# Patient Record
Sex: FEMALE | Race: WHITE | NOT HISPANIC OR LATINO | Employment: UNEMPLOYED | URBAN - METROPOLITAN AREA
[De-identification: names, ages, dates, MRNs, and addresses within clinical notes are randomized per-mention and may not be internally consistent; named-entity substitution may affect disease eponyms.]

---

## 2017-01-04 ENCOUNTER — HOSPITAL ENCOUNTER (OUTPATIENT)
Dept: RADIOLOGY | Facility: MEDICAL CENTER | Age: 57
End: 2017-01-04
Attending: SURGERY
Payer: COMMERCIAL

## 2017-01-04 DIAGNOSIS — C50.419 MALIGNANT NEOPLASM OF UPPER-OUTER QUADRANT OF FEMALE BREAST, UNSPECIFIED LATERALITY: ICD-10-CM

## 2017-01-04 PROCEDURE — 38792 RA TRACER ID OF SENTINL NODE: CPT | Mod: RT

## 2017-01-05 PROBLEM — C50.419 MALIGNANT NEOPLASM OF UPPER-OUTER QUADRANT OF FEMALE BREAST (HCC): Status: RESOLVED | Noted: 2017-01-04 | Resolved: 2017-01-05

## 2017-02-06 ENCOUNTER — DOCUMENTATION (OUTPATIENT)
Dept: OTHER | Facility: MEDICAL CENTER | Age: 57
End: 2017-02-06

## 2017-04-12 ENCOUNTER — HOSPITAL ENCOUNTER (OUTPATIENT)
Dept: LAB | Facility: MEDICAL CENTER | Age: 57
End: 2017-04-12
Attending: SPECIALIST
Payer: COMMERCIAL

## 2017-04-12 ENCOUNTER — HOSPITAL ENCOUNTER (OUTPATIENT)
Dept: LAB | Facility: MEDICAL CENTER | Age: 57
End: 2017-04-12
Payer: COMMERCIAL

## 2017-04-12 LAB
ALBUMIN SERPL BCP-MCNC: 4 G/DL (ref 3.2–4.9)
ALBUMIN/GLOB SERPL: 1.4 G/DL
ALP SERPL-CCNC: 45 U/L (ref 30–99)
ALT SERPL-CCNC: 16 U/L (ref 2–50)
ANION GAP SERPL CALC-SCNC: 6 MMOL/L (ref 0–11.9)
AST SERPL-CCNC: 15 U/L (ref 12–45)
BASOPHILS # BLD AUTO: 0.6 % (ref 0–1.8)
BASOPHILS # BLD: 0.03 K/UL (ref 0–0.12)
BILIRUB SERPL-MCNC: 0.8 MG/DL (ref 0.1–1.5)
BUN SERPL-MCNC: 15 MG/DL (ref 8–22)
CALCIUM SERPL-MCNC: 9.3 MG/DL (ref 8.5–10.5)
CHLORIDE SERPL-SCNC: 105 MMOL/L (ref 96–112)
CO2 SERPL-SCNC: 27 MMOL/L (ref 20–33)
CREAT SERPL-MCNC: 0.91 MG/DL (ref 0.5–1.4)
EOSINOPHIL # BLD AUTO: 0.19 K/UL (ref 0–0.51)
EOSINOPHIL NFR BLD: 3.7 % (ref 0–6.9)
ERYTHROCYTE [DISTWIDTH] IN BLOOD BY AUTOMATED COUNT: 37 FL (ref 35.9–50)
GFR SERPL CREATININE-BSD FRML MDRD: >60 ML/MIN/1.73 M 2
GLOBULIN SER CALC-MCNC: 2.8 G/DL (ref 1.9–3.5)
GLUCOSE SERPL-MCNC: 99 MG/DL (ref 65–99)
HCT VFR BLD AUTO: 39.2 % (ref 37–47)
HGB BLD-MCNC: 13.7 G/DL (ref 12–16)
IMM GRANULOCYTES # BLD AUTO: 0.02 K/UL (ref 0–0.11)
IMM GRANULOCYTES NFR BLD AUTO: 0.4 % (ref 0–0.9)
LYMPHOCYTES # BLD AUTO: 0.73 K/UL (ref 1–4.8)
LYMPHOCYTES NFR BLD: 14 % (ref 22–41)
MCH RBC QN AUTO: 29.5 PG (ref 27–33)
MCHC RBC AUTO-ENTMCNC: 34.9 G/DL (ref 33.6–35)
MCV RBC AUTO: 84.3 FL (ref 81.4–97.8)
MONOCYTES # BLD AUTO: 0.32 K/UL (ref 0–0.85)
MONOCYTES NFR BLD AUTO: 6.2 % (ref 0–13.4)
NEUTROPHILS # BLD AUTO: 3.91 K/UL (ref 2–7.15)
NEUTROPHILS NFR BLD: 75.1 % (ref 44–72)
NRBC # BLD AUTO: 0 K/UL
NRBC BLD AUTO-RTO: 0 /100 WBC
PLATELET # BLD AUTO: 175 K/UL (ref 164–446)
PMV BLD AUTO: 9.9 FL (ref 9–12.9)
POTASSIUM SERPL-SCNC: 4.5 MMOL/L (ref 3.6–5.5)
PROT SERPL-MCNC: 6.8 G/DL (ref 6–8.2)
RBC # BLD AUTO: 4.65 M/UL (ref 4.2–5.4)
SODIUM SERPL-SCNC: 138 MMOL/L (ref 135–145)
WBC # BLD AUTO: 5.2 K/UL (ref 4.8–10.8)

## 2017-04-12 PROCEDURE — 86480 TB TEST CELL IMMUN MEASURE: CPT

## 2017-04-12 PROCEDURE — 85025 COMPLETE CBC W/AUTO DIFF WBC: CPT

## 2017-04-12 PROCEDURE — 86787 VARICELLA-ZOSTER ANTIBODY: CPT

## 2017-04-12 PROCEDURE — 36415 COLL VENOUS BLD VENIPUNCTURE: CPT

## 2017-04-12 PROCEDURE — 80053 COMPREHEN METABOLIC PANEL: CPT

## 2017-04-13 LAB
M TB TUBERC IFN-G BLD QL: NEGATIVE
M TB TUBERC IFN-G/MITOGEN IGNF BLD: -0
M TB TUBERC IGNF/MITOGEN IGNF CONTROL: 44.06 [IU]/ML
MITOGEN IGNF BCKGRD COR BLD-ACNC: 0.02 [IU]/ML

## 2017-04-14 LAB
VZV IGG SER IA-ACNC: 2875 IV
VZV IGM SER IA-ACNC: 0.04 ISR

## 2017-04-22 ENCOUNTER — HOSPITAL ENCOUNTER (OUTPATIENT)
Dept: RADIOLOGY | Facility: MEDICAL CENTER | Age: 57
End: 2017-04-22
Attending: SPECIALIST
Payer: COMMERCIAL

## 2017-04-22 DIAGNOSIS — G35 MULTIPLE SCLEROSIS (HCC): ICD-10-CM

## 2017-04-22 PROCEDURE — 70551 MRI BRAIN STEM W/O DYE: CPT

## 2017-06-22 ENCOUNTER — HOSPITAL ENCOUNTER (OUTPATIENT)
Dept: LAB | Facility: MEDICAL CENTER | Age: 57
End: 2017-06-22
Attending: SPECIALIST
Payer: COMMERCIAL

## 2017-06-22 LAB
ALBUMIN SERPL BCP-MCNC: 4.2 G/DL (ref 3.2–4.9)
ALBUMIN/GLOB SERPL: 1.6 G/DL
ALP SERPL-CCNC: 47 U/L (ref 30–99)
ALT SERPL-CCNC: 19 U/L (ref 2–50)
ANION GAP SERPL CALC-SCNC: 6 MMOL/L (ref 0–11.9)
AST SERPL-CCNC: 17 U/L (ref 12–45)
BASOPHILS # BLD AUTO: 0.6 % (ref 0–1.8)
BASOPHILS # BLD: 0.04 K/UL (ref 0–0.12)
BILIRUB SERPL-MCNC: 0.7 MG/DL (ref 0.1–1.5)
BUN SERPL-MCNC: 15 MG/DL (ref 8–22)
CALCIUM SERPL-MCNC: 9.4 MG/DL (ref 8.5–10.5)
CHLORIDE SERPL-SCNC: 106 MMOL/L (ref 96–112)
CO2 SERPL-SCNC: 25 MMOL/L (ref 20–33)
CREAT SERPL-MCNC: 0.9 MG/DL (ref 0.5–1.4)
EOSINOPHIL # BLD AUTO: 0.15 K/UL (ref 0–0.51)
EOSINOPHIL NFR BLD: 2.4 % (ref 0–6.9)
ERYTHROCYTE [DISTWIDTH] IN BLOOD BY AUTOMATED COUNT: 38.5 FL (ref 35.9–50)
GFR SERPL CREATININE-BSD FRML MDRD: >60 ML/MIN/1.73 M 2
GLOBULIN SER CALC-MCNC: 2.6 G/DL (ref 1.9–3.5)
GLUCOSE SERPL-MCNC: 111 MG/DL (ref 65–99)
HCT VFR BLD AUTO: 40.7 % (ref 37–47)
HGB BLD-MCNC: 14.1 G/DL (ref 12–16)
IMM GRANULOCYTES # BLD AUTO: 0.02 K/UL (ref 0–0.11)
IMM GRANULOCYTES NFR BLD AUTO: 0.3 % (ref 0–0.9)
LYMPHOCYTES # BLD AUTO: 0.71 K/UL (ref 1–4.8)
LYMPHOCYTES NFR BLD: 11.5 % (ref 22–41)
MCH RBC QN AUTO: 29.1 PG (ref 27–33)
MCHC RBC AUTO-ENTMCNC: 34.6 G/DL (ref 33.6–35)
MCV RBC AUTO: 84.1 FL (ref 81.4–97.8)
MONOCYTES # BLD AUTO: 0.47 K/UL (ref 0–0.85)
MONOCYTES NFR BLD AUTO: 7.6 % (ref 0–13.4)
NEUTROPHILS # BLD AUTO: 4.78 K/UL (ref 2–7.15)
NEUTROPHILS NFR BLD: 77.6 % (ref 44–72)
NRBC # BLD AUTO: 0 K/UL
NRBC BLD AUTO-RTO: 0 /100 WBC
PLATELET # BLD AUTO: 188 K/UL (ref 164–446)
PMV BLD AUTO: 9.8 FL (ref 9–12.9)
POTASSIUM SERPL-SCNC: 4.3 MMOL/L (ref 3.6–5.5)
PROT SERPL-MCNC: 6.8 G/DL (ref 6–8.2)
RBC # BLD AUTO: 4.84 M/UL (ref 4.2–5.4)
SODIUM SERPL-SCNC: 137 MMOL/L (ref 135–145)
WBC # BLD AUTO: 6.2 K/UL (ref 4.8–10.8)

## 2017-06-22 PROCEDURE — 80053 COMPREHEN METABOLIC PANEL: CPT

## 2017-06-22 PROCEDURE — 36415 COLL VENOUS BLD VENIPUNCTURE: CPT

## 2017-06-22 PROCEDURE — 85025 COMPLETE CBC W/AUTO DIFF WBC: CPT

## 2017-07-25 ENCOUNTER — HOSPITAL ENCOUNTER (OUTPATIENT)
Dept: LAB | Facility: MEDICAL CENTER | Age: 57
End: 2017-07-25
Attending: SPECIALIST
Payer: COMMERCIAL

## 2017-07-25 LAB
BASOPHILS # BLD AUTO: 0.9 % (ref 0–1.8)
BASOPHILS # BLD: 0.05 K/UL (ref 0–0.12)
EOSINOPHIL # BLD AUTO: 0.15 K/UL (ref 0–0.51)
EOSINOPHIL NFR BLD: 2.7 % (ref 0–6.9)
ERYTHROCYTE [DISTWIDTH] IN BLOOD BY AUTOMATED COUNT: 38.8 FL (ref 35.9–50)
HCT VFR BLD AUTO: 42 % (ref 37–47)
HGB BLD-MCNC: 14.4 G/DL (ref 12–16)
IMM GRANULOCYTES # BLD AUTO: 0.01 K/UL (ref 0–0.11)
IMM GRANULOCYTES NFR BLD AUTO: 0.2 % (ref 0–0.9)
LYMPHOCYTES # BLD AUTO: 0.82 K/UL (ref 1–4.8)
LYMPHOCYTES NFR BLD: 14.8 % (ref 22–41)
MCH RBC QN AUTO: 29 PG (ref 27–33)
MCHC RBC AUTO-ENTMCNC: 34.3 G/DL (ref 33.6–35)
MCV RBC AUTO: 84.5 FL (ref 81.4–97.8)
MONOCYTES # BLD AUTO: 0.41 K/UL (ref 0–0.85)
MONOCYTES NFR BLD AUTO: 7.4 % (ref 0–13.4)
NEUTROPHILS # BLD AUTO: 4.09 K/UL (ref 2–7.15)
NEUTROPHILS NFR BLD: 74 % (ref 44–72)
NRBC # BLD AUTO: 0 K/UL
NRBC BLD AUTO-RTO: 0 /100 WBC
PLATELET # BLD AUTO: 228 K/UL (ref 164–446)
PMV BLD AUTO: 9.8 FL (ref 9–12.9)
RBC # BLD AUTO: 4.97 M/UL (ref 4.2–5.4)
WBC # BLD AUTO: 5.5 K/UL (ref 4.8–10.8)

## 2017-07-25 PROCEDURE — 85025 COMPLETE CBC W/AUTO DIFF WBC: CPT

## 2017-07-25 PROCEDURE — 80053 COMPREHEN METABOLIC PANEL: CPT

## 2017-07-25 PROCEDURE — 36415 COLL VENOUS BLD VENIPUNCTURE: CPT

## 2017-07-26 LAB
ALBUMIN SERPL BCP-MCNC: 4.2 G/DL (ref 3.2–4.9)
ALBUMIN/GLOB SERPL: 1.5 G/DL
ALP SERPL-CCNC: 49 U/L (ref 30–99)
ALT SERPL-CCNC: 31 U/L (ref 2–50)
ANION GAP SERPL CALC-SCNC: 9 MMOL/L (ref 0–11.9)
AST SERPL-CCNC: 23 U/L (ref 12–45)
BILIRUB SERPL-MCNC: 0.8 MG/DL (ref 0.1–1.5)
BUN SERPL-MCNC: 13 MG/DL (ref 8–22)
CALCIUM SERPL-MCNC: 9.7 MG/DL (ref 8.5–10.5)
CHLORIDE SERPL-SCNC: 105 MMOL/L (ref 96–112)
CO2 SERPL-SCNC: 24 MMOL/L (ref 20–33)
CREAT SERPL-MCNC: 0.93 MG/DL (ref 0.5–1.4)
GFR SERPL CREATININE-BSD FRML MDRD: >60 ML/MIN/1.73 M 2
GLOBULIN SER CALC-MCNC: 2.8 G/DL (ref 1.9–3.5)
GLUCOSE SERPL-MCNC: 94 MG/DL (ref 65–99)
POTASSIUM SERPL-SCNC: 4.3 MMOL/L (ref 3.6–5.5)
PROT SERPL-MCNC: 7 G/DL (ref 6–8.2)
SODIUM SERPL-SCNC: 138 MMOL/L (ref 135–145)

## 2017-08-25 ENCOUNTER — HOSPITAL ENCOUNTER (OUTPATIENT)
Dept: LAB | Facility: MEDICAL CENTER | Age: 57
End: 2017-08-25
Attending: SPECIALIST
Payer: COMMERCIAL

## 2017-08-25 LAB
ALBUMIN SERPL BCP-MCNC: 4 G/DL (ref 3.2–4.9)
ALBUMIN/GLOB SERPL: 1.5 G/DL
ALP SERPL-CCNC: 43 U/L (ref 30–99)
ALT SERPL-CCNC: 27 U/L (ref 2–50)
ANION GAP SERPL CALC-SCNC: 8 MMOL/L (ref 0–11.9)
AST SERPL-CCNC: 20 U/L (ref 12–45)
BASOPHILS # BLD AUTO: 0.9 % (ref 0–1.8)
BASOPHILS # BLD: 0.05 K/UL (ref 0–0.12)
BILIRUB SERPL-MCNC: 0.5 MG/DL (ref 0.1–1.5)
BUN SERPL-MCNC: 13 MG/DL (ref 8–22)
CALCIUM SERPL-MCNC: 9.4 MG/DL (ref 8.5–10.5)
CHLORIDE SERPL-SCNC: 106 MMOL/L (ref 96–112)
CO2 SERPL-SCNC: 24 MMOL/L (ref 20–33)
CREAT SERPL-MCNC: 0.85 MG/DL (ref 0.5–1.4)
EOSINOPHIL # BLD AUTO: 0.18 K/UL (ref 0–0.51)
EOSINOPHIL NFR BLD: 3.4 % (ref 0–6.9)
ERYTHROCYTE [DISTWIDTH] IN BLOOD BY AUTOMATED COUNT: 38.8 FL (ref 35.9–50)
GFR SERPL CREATININE-BSD FRML MDRD: >60 ML/MIN/1.73 M 2
GLOBULIN SER CALC-MCNC: 2.7 G/DL (ref 1.9–3.5)
GLUCOSE SERPL-MCNC: 115 MG/DL (ref 65–99)
HCT VFR BLD AUTO: 40.2 % (ref 37–47)
HGB BLD-MCNC: 14 G/DL (ref 12–16)
IMM GRANULOCYTES # BLD AUTO: 0.01 K/UL (ref 0–0.11)
IMM GRANULOCYTES NFR BLD AUTO: 0.2 % (ref 0–0.9)
LYMPHOCYTES # BLD AUTO: 0.64 K/UL (ref 1–4.8)
LYMPHOCYTES NFR BLD: 12 % (ref 22–41)
MCH RBC QN AUTO: 29.7 PG (ref 27–33)
MCHC RBC AUTO-ENTMCNC: 34.8 G/DL (ref 33.6–35)
MCV RBC AUTO: 85.4 FL (ref 81.4–97.8)
MONOCYTES # BLD AUTO: 0.4 K/UL (ref 0–0.85)
MONOCYTES NFR BLD AUTO: 7.5 % (ref 0–13.4)
NEUTROPHILS # BLD AUTO: 4.05 K/UL (ref 2–7.15)
NEUTROPHILS NFR BLD: 76 % (ref 44–72)
NRBC # BLD AUTO: 0 K/UL
NRBC BLD AUTO-RTO: 0 /100 WBC
PLATELET # BLD AUTO: 198 K/UL (ref 164–446)
PMV BLD AUTO: 9.7 FL (ref 9–12.9)
POTASSIUM SERPL-SCNC: 4.2 MMOL/L (ref 3.6–5.5)
PROT SERPL-MCNC: 6.7 G/DL (ref 6–8.2)
RBC # BLD AUTO: 4.71 M/UL (ref 4.2–5.4)
SODIUM SERPL-SCNC: 138 MMOL/L (ref 135–145)
WBC # BLD AUTO: 5.3 K/UL (ref 4.8–10.8)

## 2017-08-25 PROCEDURE — 36415 COLL VENOUS BLD VENIPUNCTURE: CPT

## 2017-08-25 PROCEDURE — 80053 COMPREHEN METABOLIC PANEL: CPT

## 2017-08-25 PROCEDURE — 85025 COMPLETE CBC W/AUTO DIFF WBC: CPT

## 2017-09-26 ENCOUNTER — HOSPITAL ENCOUNTER (OUTPATIENT)
Dept: LAB | Facility: MEDICAL CENTER | Age: 57
End: 2017-09-26
Attending: SPECIALIST
Payer: COMMERCIAL

## 2017-09-26 LAB
ALBUMIN SERPL BCP-MCNC: 4 G/DL (ref 3.2–4.9)
ALBUMIN/GLOB SERPL: 1.5 G/DL
ALP SERPL-CCNC: 37 U/L (ref 30–99)
ALT SERPL-CCNC: 18 U/L (ref 2–50)
ANION GAP SERPL CALC-SCNC: 8 MMOL/L (ref 0–11.9)
AST SERPL-CCNC: 17 U/L (ref 12–45)
BASOPHILS # BLD AUTO: 0.7 % (ref 0–1.8)
BASOPHILS # BLD: 0.04 K/UL (ref 0–0.12)
BILIRUB SERPL-MCNC: 0.7 MG/DL (ref 0.1–1.5)
BUN SERPL-MCNC: 12 MG/DL (ref 8–22)
CALCIUM SERPL-MCNC: 9.2 MG/DL (ref 8.5–10.5)
CHLORIDE SERPL-SCNC: 105 MMOL/L (ref 96–112)
CO2 SERPL-SCNC: 24 MMOL/L (ref 20–33)
CREAT SERPL-MCNC: 0.85 MG/DL (ref 0.5–1.4)
EOSINOPHIL # BLD AUTO: 0.15 K/UL (ref 0–0.51)
EOSINOPHIL NFR BLD: 2.6 % (ref 0–6.9)
ERYTHROCYTE [DISTWIDTH] IN BLOOD BY AUTOMATED COUNT: 38.2 FL (ref 35.9–50)
GFR SERPL CREATININE-BSD FRML MDRD: >60 ML/MIN/1.73 M 2
GLOBULIN SER CALC-MCNC: 2.6 G/DL (ref 1.9–3.5)
GLUCOSE SERPL-MCNC: 93 MG/DL (ref 65–99)
HCT VFR BLD AUTO: 39.2 % (ref 37–47)
HGB BLD-MCNC: 13.3 G/DL (ref 12–16)
IMM GRANULOCYTES # BLD AUTO: 0.02 K/UL (ref 0–0.11)
IMM GRANULOCYTES NFR BLD AUTO: 0.3 % (ref 0–0.9)
LYMPHOCYTES # BLD AUTO: 0.91 K/UL (ref 1–4.8)
LYMPHOCYTES NFR BLD: 15.7 % (ref 22–41)
MCH RBC QN AUTO: 28.7 PG (ref 27–33)
MCHC RBC AUTO-ENTMCNC: 33.9 G/DL (ref 33.6–35)
MCV RBC AUTO: 84.7 FL (ref 81.4–97.8)
MONOCYTES # BLD AUTO: 0.42 K/UL (ref 0–0.85)
MONOCYTES NFR BLD AUTO: 7.2 % (ref 0–13.4)
NEUTROPHILS # BLD AUTO: 4.27 K/UL (ref 2–7.15)
NEUTROPHILS NFR BLD: 73.5 % (ref 44–72)
NRBC # BLD AUTO: 0 K/UL
NRBC BLD AUTO-RTO: 0 /100 WBC
PLATELET # BLD AUTO: 200 K/UL (ref 164–446)
PMV BLD AUTO: 9.7 FL (ref 9–12.9)
POTASSIUM SERPL-SCNC: 4.3 MMOL/L (ref 3.6–5.5)
PROT SERPL-MCNC: 6.6 G/DL (ref 6–8.2)
RBC # BLD AUTO: 4.63 M/UL (ref 4.2–5.4)
SODIUM SERPL-SCNC: 137 MMOL/L (ref 135–145)
WBC # BLD AUTO: 5.8 K/UL (ref 4.8–10.8)

## 2017-09-26 PROCEDURE — 85025 COMPLETE CBC W/AUTO DIFF WBC: CPT

## 2017-09-26 PROCEDURE — 80053 COMPREHEN METABOLIC PANEL: CPT

## 2017-09-26 PROCEDURE — 36415 COLL VENOUS BLD VENIPUNCTURE: CPT

## 2017-10-03 ENCOUNTER — HOSPITAL ENCOUNTER (OUTPATIENT)
Dept: LAB | Facility: MEDICAL CENTER | Age: 57
End: 2017-10-03
Attending: SPECIALIST
Payer: COMMERCIAL

## 2017-10-03 PROCEDURE — 81001 URINALYSIS AUTO W/SCOPE: CPT

## 2017-10-03 PROCEDURE — 87086 URINE CULTURE/COLONY COUNT: CPT

## 2017-10-04 LAB
APPEARANCE UR: CLEAR
APPEARANCE UR: CLEAR
BACTERIA #/AREA URNS HPF: NEGATIVE /HPF
BILIRUB UR QL STRIP.AUTO: ABNORMAL
BILIRUB UR QL STRIP.AUTO: ABNORMAL
COLOR UR: ABNORMAL
COLOR UR: ABNORMAL
CULTURE IF INDICATED INDCX: YES UA CULTURE
EPI CELLS #/AREA URNS HPF: ABNORMAL /HPF
GLUCOSE UR STRIP.AUTO-MCNC: NEGATIVE MG/DL
GLUCOSE UR STRIP.AUTO-MCNC: NEGATIVE MG/DL
HYALINE CASTS #/AREA URNS LPF: ABNORMAL /LPF
KETONES UR STRIP.AUTO-MCNC: ABNORMAL MG/DL
KETONES UR STRIP.AUTO-MCNC: ABNORMAL MG/DL
LEUKOCYTE ESTERASE UR QL STRIP.AUTO: NEGATIVE
LEUKOCYTE ESTERASE UR QL STRIP.AUTO: NEGATIVE
MICRO URNS: ABNORMAL
MICRO URNS: ABNORMAL
NITRITE UR QL STRIP.AUTO: NEGATIVE
NITRITE UR QL STRIP.AUTO: NEGATIVE
PH UR STRIP.AUTO: 6 [PH]
PH UR STRIP.AUTO: 6 [PH]
PROT UR QL STRIP: 30 MG/DL
PROT UR QL STRIP: 30 MG/DL
RBC # URNS HPF: ABNORMAL /HPF
RBC UR QL AUTO: NEGATIVE
RBC UR QL AUTO: NEGATIVE
SP GR UR STRIP.AUTO: 1.03
SP GR UR STRIP.AUTO: 1.03
UROBILINOGEN UR STRIP.AUTO-MCNC: 2 MG/DL
UROBILINOGEN UR STRIP.AUTO-MCNC: NORMAL MG/DL
WBC #/AREA URNS HPF: ABNORMAL /HPF

## 2017-10-06 LAB
BACTERIA UR CULT: NORMAL
SIGNIFICANT IND 70042: NORMAL
SOURCE SOURCE: NORMAL

## 2017-10-12 ENCOUNTER — OUTPATIENT INFUSION SERVICES (OUTPATIENT)
Dept: ONCOLOGY | Facility: MEDICAL CENTER | Age: 57
End: 2017-10-12
Attending: SPECIALIST
Payer: COMMERCIAL

## 2017-10-12 VITALS
TEMPERATURE: 97.5 F | WEIGHT: 185.41 LBS | HEART RATE: 79 BPM | SYSTOLIC BLOOD PRESSURE: 129 MMHG | DIASTOLIC BLOOD PRESSURE: 68 MMHG | OXYGEN SATURATION: 96 % | BODY MASS INDEX: 32.85 KG/M2 | HEIGHT: 63 IN | RESPIRATION RATE: 18 BRPM

## 2017-10-12 PROCEDURE — 700105 HCHG RX REV CODE 258: Performed by: SPECIALIST

## 2017-10-12 PROCEDURE — 700111 HCHG RX REV CODE 636 W/ 250 OVERRIDE (IP): Performed by: SPECIALIST

## 2017-10-12 PROCEDURE — 96365 THER/PROPH/DIAG IV INF INIT: CPT

## 2017-10-12 RX ADMIN — SODIUM CHLORIDE 1000 MG: 9 INJECTION, SOLUTION INTRAVENOUS at 16:26

## 2017-10-12 ASSESSMENT — PAIN SCALES - GENERAL: PAINLEVEL: 4=SLIGHT-MODERATE PAIN

## 2017-10-13 ENCOUNTER — OUTPATIENT INFUSION SERVICES (OUTPATIENT)
Dept: ONCOLOGY | Facility: MEDICAL CENTER | Age: 57
End: 2017-10-13
Attending: SPECIALIST
Payer: COMMERCIAL

## 2017-10-13 VITALS
HEIGHT: 63 IN | WEIGHT: 185.41 LBS | BODY MASS INDEX: 32.85 KG/M2 | OXYGEN SATURATION: 93 % | HEART RATE: 77 BPM | DIASTOLIC BLOOD PRESSURE: 57 MMHG | SYSTOLIC BLOOD PRESSURE: 111 MMHG | RESPIRATION RATE: 18 BRPM | TEMPERATURE: 99 F

## 2017-10-13 PROCEDURE — 96365 THER/PROPH/DIAG IV INF INIT: CPT

## 2017-10-13 PROCEDURE — 700105 HCHG RX REV CODE 258: Performed by: SPECIALIST

## 2017-10-13 PROCEDURE — 700111 HCHG RX REV CODE 636 W/ 250 OVERRIDE (IP): Performed by: SPECIALIST

## 2017-10-13 RX ADMIN — SODIUM CHLORIDE 1000 MG: 9 INJECTION, SOLUTION INTRAVENOUS at 13:55

## 2017-10-13 ASSESSMENT — PAIN SCALES - GENERAL: PAINLEVEL: NO PAIN

## 2017-10-13 NOTE — PROGRESS NOTES
Pt arrived for day #1/3 of Solumedrol for an MS exacerbation. Pt told RN that her MS flare developed through increasing weakness and pain in both of her legs. Stated that she tried Solu-Medrol for a flare in the past, but it didn't work. Pt was hopefull that infusion will work this time. Education done on Solumedrol and possible side effects. Pt given informational handout, and questions answered by RN as presented. Pt requested that PIV be removed after infusion, IV discontinued, bleeding controlled with gauze and coban after. Tomorrow's appointment at 13:30 confirmed with Pt prior to leaving, left department by self using personal cane appearing in good spirits and NAD.

## 2017-10-14 ENCOUNTER — OUTPATIENT INFUSION SERVICES (OUTPATIENT)
Dept: ONCOLOGY | Facility: MEDICAL CENTER | Age: 57
End: 2017-10-14
Attending: SPECIALIST
Payer: COMMERCIAL

## 2017-10-14 VITALS
HEIGHT: 63 IN | TEMPERATURE: 97.2 F | HEART RATE: 80 BPM | SYSTOLIC BLOOD PRESSURE: 110 MMHG | DIASTOLIC BLOOD PRESSURE: 70 MMHG | BODY MASS INDEX: 32.66 KG/M2 | RESPIRATION RATE: 18 BRPM | OXYGEN SATURATION: 95 % | WEIGHT: 184.3 LBS

## 2017-10-14 PROCEDURE — 700111 HCHG RX REV CODE 636 W/ 250 OVERRIDE (IP): Performed by: SPECIALIST

## 2017-10-14 PROCEDURE — 700105 HCHG RX REV CODE 258: Performed by: SPECIALIST

## 2017-10-14 PROCEDURE — 96365 THER/PROPH/DIAG IV INF INIT: CPT

## 2017-10-14 RX ADMIN — SODIUM CHLORIDE 1000 MG: 9 INJECTION, SOLUTION INTRAVENOUS at 13:36

## 2017-10-14 ASSESSMENT — PAIN SCALES - GENERAL: PAINLEVEL: 4=SLIGHT-MODERATE PAIN

## 2017-10-14 NOTE — PROGRESS NOTES
Lisa arrives for day #3/3 of solumedrol to treat an MS exacerbation PIV established. Solumedrol given over 1 hour. PIV removed; gauze/coban applied over site. Patient to follow up with MD for future plan of care. Discharged to self care with cane; no apparent distress noted.

## 2017-10-14 NOTE — PROGRESS NOTES
Mrs Chiang arrives for day #2 of solumedrol to treat an MS exacerbation. Education done on solumedrol and possible side effects.Lisa given informational handout on the drug. PIV removed per patients request, site wrapped in gauze and coban. Lisa DC'd home in good  condition with  with family. Lisa returns tomorrow for day #3. Patient fall today at the IS, refuses ER evaluation, no deficiencies or other injuries noted.

## 2017-10-31 ENCOUNTER — HOSPITAL ENCOUNTER (OUTPATIENT)
Dept: LAB | Facility: MEDICAL CENTER | Age: 57
End: 2017-10-31
Attending: SPECIALIST
Payer: COMMERCIAL

## 2017-10-31 LAB
BASOPHILS # BLD AUTO: 1 % (ref 0–1.8)
BASOPHILS # BLD: 0.04 K/UL (ref 0–0.12)
EOSINOPHIL # BLD AUTO: 0.12 K/UL (ref 0–0.51)
EOSINOPHIL NFR BLD: 2.9 % (ref 0–6.9)
ERYTHROCYTE [DISTWIDTH] IN BLOOD BY AUTOMATED COUNT: 40.7 FL (ref 35.9–50)
HCT VFR BLD AUTO: 38.8 % (ref 37–47)
HGB BLD-MCNC: 13.2 G/DL (ref 12–16)
IMM GRANULOCYTES # BLD AUTO: 0.02 K/UL (ref 0–0.11)
IMM GRANULOCYTES NFR BLD AUTO: 0.5 % (ref 0–0.9)
LYMPHOCYTES # BLD AUTO: 0.68 K/UL (ref 1–4.8)
LYMPHOCYTES NFR BLD: 16.2 % (ref 22–41)
MCH RBC QN AUTO: 29.5 PG (ref 27–33)
MCHC RBC AUTO-ENTMCNC: 34 G/DL (ref 33.6–35)
MCV RBC AUTO: 86.8 FL (ref 81.4–97.8)
MONOCYTES # BLD AUTO: 0.35 K/UL (ref 0–0.85)
MONOCYTES NFR BLD AUTO: 8.3 % (ref 0–13.4)
NEUTROPHILS # BLD AUTO: 3 K/UL (ref 2–7.15)
NEUTROPHILS NFR BLD: 71.1 % (ref 44–72)
NRBC # BLD AUTO: 0 K/UL
NRBC BLD AUTO-RTO: 0 /100 WBC
PLATELET # BLD AUTO: 163 K/UL (ref 164–446)
PMV BLD AUTO: 9.6 FL (ref 9–12.9)
RBC # BLD AUTO: 4.47 M/UL (ref 4.2–5.4)
WBC # BLD AUTO: 4.2 K/UL (ref 4.8–10.8)

## 2017-10-31 PROCEDURE — 85025 COMPLETE CBC W/AUTO DIFF WBC: CPT

## 2017-10-31 PROCEDURE — 36415 COLL VENOUS BLD VENIPUNCTURE: CPT

## 2017-10-31 PROCEDURE — 80053 COMPREHEN METABOLIC PANEL: CPT

## 2017-11-01 LAB
ALBUMIN SERPL BCP-MCNC: 4.1 G/DL (ref 3.2–4.9)
ALBUMIN/GLOB SERPL: 1.7 G/DL
ALP SERPL-CCNC: 39 U/L (ref 30–99)
ALT SERPL-CCNC: 19 U/L (ref 2–50)
ANION GAP SERPL CALC-SCNC: 7 MMOL/L (ref 0–11.9)
AST SERPL-CCNC: 16 U/L (ref 12–45)
BILIRUB SERPL-MCNC: 0.8 MG/DL (ref 0.1–1.5)
BUN SERPL-MCNC: 13 MG/DL (ref 8–22)
CALCIUM SERPL-MCNC: 8.9 MG/DL (ref 8.5–10.5)
CHLORIDE SERPL-SCNC: 106 MMOL/L (ref 96–112)
CO2 SERPL-SCNC: 25 MMOL/L (ref 20–33)
CREAT SERPL-MCNC: 0.82 MG/DL (ref 0.5–1.4)
GFR SERPL CREATININE-BSD FRML MDRD: >60 ML/MIN/1.73 M 2
GLOBULIN SER CALC-MCNC: 2.4 G/DL (ref 1.9–3.5)
GLUCOSE SERPL-MCNC: 91 MG/DL (ref 65–99)
POTASSIUM SERPL-SCNC: 4.1 MMOL/L (ref 3.6–5.5)
PROT SERPL-MCNC: 6.5 G/DL (ref 6–8.2)
SODIUM SERPL-SCNC: 138 MMOL/L (ref 135–145)

## 2017-11-16 ENCOUNTER — HOSPITAL ENCOUNTER (OUTPATIENT)
Dept: RADIOLOGY | Facility: MEDICAL CENTER | Age: 57
End: 2017-11-16
Attending: INTERNAL MEDICINE
Payer: COMMERCIAL

## 2017-11-16 DIAGNOSIS — C43.72 MALIGNANT MELANOMA OF LEFT LOWER EXTREMITY INCLUDING HIP (HCC): ICD-10-CM

## 2017-11-16 DIAGNOSIS — C50.211 MALIGNANT NEOPLASM OF UPPER-INNER QUADRANT OF RIGHT FEMALE BREAST, UNSPECIFIED ESTROGEN RECEPTOR STATUS (HCC): ICD-10-CM

## 2017-11-16 PROCEDURE — A9552 F18 FDG: HCPCS

## 2018-03-27 ENCOUNTER — HOSPITAL ENCOUNTER (OUTPATIENT)
Dept: LAB | Facility: MEDICAL CENTER | Age: 58
End: 2018-03-27
Attending: SPECIALIST
Payer: COMMERCIAL

## 2018-03-27 LAB
ALBUMIN SERPL BCP-MCNC: 4.1 G/DL (ref 3.2–4.9)
ALBUMIN/GLOB SERPL: 1.7 G/DL
ALP SERPL-CCNC: 39 U/L (ref 30–99)
ALT SERPL-CCNC: 14 U/L (ref 2–50)
ANION GAP SERPL CALC-SCNC: 7 MMOL/L (ref 0–11.9)
AST SERPL-CCNC: 14 U/L (ref 12–45)
BASOPHILS # BLD AUTO: 0.9 % (ref 0–1.8)
BASOPHILS # BLD: 0.05 K/UL (ref 0–0.12)
BILIRUB SERPL-MCNC: 0.8 MG/DL (ref 0.1–1.5)
BUN SERPL-MCNC: 14 MG/DL (ref 8–22)
CALCIUM SERPL-MCNC: 9.5 MG/DL (ref 8.5–10.5)
CHLORIDE SERPL-SCNC: 106 MMOL/L (ref 96–112)
CO2 SERPL-SCNC: 24 MMOL/L (ref 20–33)
CREAT SERPL-MCNC: 0.8 MG/DL (ref 0.5–1.4)
EOSINOPHIL # BLD AUTO: 0.24 K/UL (ref 0–0.51)
EOSINOPHIL NFR BLD: 4.3 % (ref 0–6.9)
ERYTHROCYTE [DISTWIDTH] IN BLOOD BY AUTOMATED COUNT: 39.8 FL (ref 35.9–50)
GLOBULIN SER CALC-MCNC: 2.4 G/DL (ref 1.9–3.5)
GLUCOSE SERPL-MCNC: 94 MG/DL (ref 65–99)
HCT VFR BLD AUTO: 39.8 % (ref 37–47)
HGB BLD-MCNC: 14 G/DL (ref 12–16)
IMM GRANULOCYTES # BLD AUTO: 0.01 K/UL (ref 0–0.11)
IMM GRANULOCYTES NFR BLD AUTO: 0.2 % (ref 0–0.9)
LYMPHOCYTES # BLD AUTO: 0.91 K/UL (ref 1–4.8)
LYMPHOCYTES NFR BLD: 16.3 % (ref 22–41)
MCH RBC QN AUTO: 29.4 PG (ref 27–33)
MCHC RBC AUTO-ENTMCNC: 35.2 G/DL (ref 33.6–35)
MCV RBC AUTO: 83.6 FL (ref 81.4–97.8)
MONOCYTES # BLD AUTO: 0.39 K/UL (ref 0–0.85)
MONOCYTES NFR BLD AUTO: 7 % (ref 0–13.4)
NEUTROPHILS # BLD AUTO: 3.98 K/UL (ref 2–7.15)
NEUTROPHILS NFR BLD: 71.3 % (ref 44–72)
NRBC # BLD AUTO: 0 K/UL
NRBC BLD-RTO: 0 /100 WBC
PLATELET # BLD AUTO: 188 K/UL (ref 164–446)
PMV BLD AUTO: 9.8 FL (ref 9–12.9)
POTASSIUM SERPL-SCNC: 4.3 MMOL/L (ref 3.6–5.5)
PROT SERPL-MCNC: 6.5 G/DL (ref 6–8.2)
RBC # BLD AUTO: 4.76 M/UL (ref 4.2–5.4)
SODIUM SERPL-SCNC: 137 MMOL/L (ref 135–145)
WBC # BLD AUTO: 5.6 K/UL (ref 4.8–10.8)

## 2018-03-27 PROCEDURE — 36415 COLL VENOUS BLD VENIPUNCTURE: CPT

## 2018-03-27 PROCEDURE — 80053 COMPREHEN METABOLIC PANEL: CPT

## 2018-03-27 PROCEDURE — 85025 COMPLETE CBC W/AUTO DIFF WBC: CPT

## 2018-06-15 ENCOUNTER — OFFICE VISIT (OUTPATIENT)
Dept: URGENT CARE | Facility: CLINIC | Age: 58
End: 2018-06-15
Payer: COMMERCIAL

## 2018-06-15 VITALS
SYSTOLIC BLOOD PRESSURE: 120 MMHG | HEART RATE: 96 BPM | HEIGHT: 63 IN | BODY MASS INDEX: 30.12 KG/M2 | OXYGEN SATURATION: 98 % | TEMPERATURE: 96.8 F | DIASTOLIC BLOOD PRESSURE: 70 MMHG | WEIGHT: 170 LBS

## 2018-06-15 DIAGNOSIS — S01.00XA OPEN WOUND OF SCALP, UNSPECIFIED OPEN WOUND TYPE, INITIAL ENCOUNTER: ICD-10-CM

## 2018-06-15 PROCEDURE — 12002 RPR S/N/AX/GEN/TRNK2.6-7.5CM: CPT | Performed by: NURSE PRACTITIONER

## 2018-06-15 ASSESSMENT — ENCOUNTER SYMPTOMS
NECK PAIN: 0
BLURRED VISION: 0
HEADACHES: 0
NAUSEA: 0
DOUBLE VISION: 0
LOSS OF CONSCIOUSNESS: 0
BACK PAIN: 0

## 2018-06-16 NOTE — PROGRESS NOTES
"Subjective:      Lisa Chiang is a 57 y.o. female who presents with Laceration (\"pt fell backwards and hit head on the wall\")            HPI New problem. 57 year old female with laceration to back of head after a fall into a wall. She has MS so her mobility is impaired. She denies LOC, headache, nausea, dizziness or visual changes. She denies neck pain or back pain. She has come straight here for evaluation.  Patient has no known allergies.  Current Outpatient Prescriptions on File Prior to Visit   Medication Sig Dispense Refill   • Teriflunomide (AUBAGIO PO) Take  by mouth.     • levothyroxine (SYNTHROID) 150 MCG Tab Take 150 mcg by mouth Every morning on an empty stomach.     • acetaminophen (TYLENOL) 325 MG Tab Take 650 mg by mouth every four hours as needed for Mild Pain.     • paroxetine (PAXIL) 30 MG TABS Take 45 mg by mouth every morning. Take with food     • oxycodone immediate-release (ROXICODONE) 5 MG Tab Take 1-2 Tabs by mouth every four hours as needed (Moderate Pain (NRS Pain Scale 4-6; CPOT Pain Scale 3-5)). 30 Tab 0   • aspirin (ASA) 81 MG Chew Tab chewable tablet Take 2 Tabs by mouth every day. 100 Tab      No current facility-administered medications on file prior to visit.      Social History     Social History   • Marital status:      Spouse name: N/A   • Number of children: N/A   • Years of education: N/A     Occupational History   • Not on file.     Social History Main Topics   • Smoking status: Former Smoker     Packs/day: 1.00     Years: 20.00     Types: Cigarettes     Quit date: 7/5/1997   • Smokeless tobacco: Never Used   • Alcohol use No   • Drug use: No   • Sexual activity: Not on file     Other Topics Concern   • Not on file     Social History Narrative   • No narrative on file     family history includes Cancer in her maternal aunt.      Review of Systems   Eyes: Negative for blurred vision and double vision.   Gastrointestinal: Negative for nausea.   Musculoskeletal: " "Negative for back pain and neck pain.   Skin:        Laceration to back of head.   Neurological: Negative for loss of consciousness and headaches.          Objective:     /70   Pulse 96   Temp 36 °C (96.8 °F)   Ht 1.6 m (5' 3\")   Wt 77.1 kg (170 lb)   SpO2 98%   BMI 30.11 kg/m²      Physical Exam   Constitutional: She is oriented to person, place, and time. She appears well-developed and well-nourished. No distress.   HENT:   Head: Normocephalic and atraumatic.   Right Ear: External ear and ear canal normal. Tympanic membrane is not injected and not perforated. No middle ear effusion.   Left Ear: External ear and ear canal normal. Tympanic membrane is not injected and not perforated.  No middle ear effusion.   Nose: Mucosal edema present.   Mouth/Throat: Posterior oropharyngeal erythema present. No oropharyngeal exudate.   Eyes: Conjunctivae are normal. Right eye exhibits no discharge. Left eye exhibits no discharge.   Neck: Normal range of motion. Neck supple.   Cardiovascular: Normal rate, regular rhythm and normal heart sounds.    No murmur heard.  Pulmonary/Chest: Effort normal and breath sounds normal. No respiratory distress.   Musculoskeletal: Normal range of motion.   Normal movement of all 4 extremities.   Lymphadenopathy:     She has no cervical adenopathy.        Right: No supraclavicular adenopathy present.        Left: No supraclavicular adenopathy present.   Neurological: She is alert and oriented to person, place, and time. No cranial nerve deficit. Gait normal.   Skin: Skin is warm and dry.   approx 6 inch laceration to posterior scalp area.   Psychiatric: She has a normal mood and affect. Her behavior is normal. Thought content normal.   Nursing note and vitals reviewed.    Procedure: Laceration Repair  -Risks including bleeding, nerve damage, infection, and poor cosmetic outcome discussed at length. Benefits and alternatives discussed.   -Sterile technique throughout  -Local anesthesia " with 2% lidocaine with epi.-Closed with 6 staples with good wound approximation  -Patient tolerated well                Assessment/Plan:     1. Open wound of scalp, unspecified open wound type, initial encounter  Wound closure as above.  Wound care instructions to patient and spouse.  RTC 7 days for removal.

## 2018-06-25 ENCOUNTER — HOSPITAL ENCOUNTER (OUTPATIENT)
Dept: RADIOLOGY | Facility: MEDICAL CENTER | Age: 58
End: 2018-06-25
Attending: FAMILY MEDICINE
Payer: COMMERCIAL

## 2018-06-25 ENCOUNTER — OFFICE VISIT (OUTPATIENT)
Dept: URGENT CARE | Facility: CLINIC | Age: 58
End: 2018-06-25
Payer: COMMERCIAL

## 2018-06-25 VITALS
TEMPERATURE: 98.7 F | WEIGHT: 170 LBS | OXYGEN SATURATION: 97 % | SYSTOLIC BLOOD PRESSURE: 110 MMHG | HEART RATE: 64 BPM | DIASTOLIC BLOOD PRESSURE: 60 MMHG | BODY MASS INDEX: 30.12 KG/M2 | HEIGHT: 63 IN

## 2018-06-25 DIAGNOSIS — M79.672 LEFT FOOT PAIN: ICD-10-CM

## 2018-06-25 DIAGNOSIS — S01.01XD LACERATION OF SCALP, SUBSEQUENT ENCOUNTER: ICD-10-CM

## 2018-06-25 PROCEDURE — 73620 X-RAY EXAM OF FOOT: CPT | Mod: LT

## 2018-06-25 PROCEDURE — 99024 POSTOP FOLLOW-UP VISIT: CPT | Performed by: FAMILY MEDICINE

## 2018-06-25 NOTE — PROGRESS NOTES
Here for wound check, s/p scalp lac repair      O:    Lac in back of scalp:  C/D/I with staples intact         P:  Staples removed  Wound intact

## 2018-08-07 ENCOUNTER — HOSPITAL ENCOUNTER (OUTPATIENT)
Dept: LAB | Facility: MEDICAL CENTER | Age: 58
End: 2018-08-07
Attending: SPECIALIST
Payer: COMMERCIAL

## 2018-08-07 LAB
ALBUMIN SERPL BCP-MCNC: 4.6 G/DL (ref 3.2–4.9)
ALBUMIN/GLOB SERPL: 2.4 G/DL
ALP SERPL-CCNC: 44 U/L (ref 30–99)
ALT SERPL-CCNC: 12 U/L (ref 2–50)
ANION GAP SERPL CALC-SCNC: 7 MMOL/L (ref 0–11.9)
AST SERPL-CCNC: 12 U/L (ref 12–45)
BASOPHILS # BLD AUTO: 0.6 % (ref 0–1.8)
BASOPHILS # BLD: 0.03 K/UL (ref 0–0.12)
BILIRUB SERPL-MCNC: 0.6 MG/DL (ref 0.1–1.5)
BUN SERPL-MCNC: 14 MG/DL (ref 8–22)
CALCIUM SERPL-MCNC: 9.2 MG/DL (ref 8.5–10.5)
CHLORIDE SERPL-SCNC: 106 MMOL/L (ref 96–112)
CO2 SERPL-SCNC: 25 MMOL/L (ref 20–33)
CREAT SERPL-MCNC: 0.81 MG/DL (ref 0.5–1.4)
EOSINOPHIL # BLD AUTO: 0.17 K/UL (ref 0–0.51)
EOSINOPHIL NFR BLD: 3.1 % (ref 0–6.9)
ERYTHROCYTE [DISTWIDTH] IN BLOOD BY AUTOMATED COUNT: 37.6 FL (ref 35.9–50)
GLOBULIN SER CALC-MCNC: 1.9 G/DL (ref 1.9–3.5)
GLUCOSE SERPL-MCNC: 106 MG/DL (ref 65–99)
HCT VFR BLD AUTO: 39.9 % (ref 37–47)
HGB BLD-MCNC: 13.9 G/DL (ref 12–16)
IMM GRANULOCYTES # BLD AUTO: 0.02 K/UL (ref 0–0.11)
IMM GRANULOCYTES NFR BLD AUTO: 0.4 % (ref 0–0.9)
LYMPHOCYTES # BLD AUTO: 0.92 K/UL (ref 1–4.8)
LYMPHOCYTES NFR BLD: 17 % (ref 22–41)
MCH RBC QN AUTO: 29.1 PG (ref 27–33)
MCHC RBC AUTO-ENTMCNC: 34.8 G/DL (ref 33.6–35)
MCV RBC AUTO: 83.6 FL (ref 81.4–97.8)
MONOCYTES # BLD AUTO: 0.39 K/UL (ref 0–0.85)
MONOCYTES NFR BLD AUTO: 7.2 % (ref 0–13.4)
NEUTROPHILS # BLD AUTO: 3.87 K/UL (ref 2–7.15)
NEUTROPHILS NFR BLD: 71.7 % (ref 44–72)
NRBC # BLD AUTO: 0 K/UL
NRBC BLD-RTO: 0 /100 WBC
PLATELET # BLD AUTO: 200 K/UL (ref 164–446)
PMV BLD AUTO: 9.7 FL (ref 9–12.9)
POTASSIUM SERPL-SCNC: 4 MMOL/L (ref 3.6–5.5)
PROT SERPL-MCNC: 6.5 G/DL (ref 6–8.2)
RBC # BLD AUTO: 4.77 M/UL (ref 4.2–5.4)
SODIUM SERPL-SCNC: 138 MMOL/L (ref 135–145)
WBC # BLD AUTO: 5.4 K/UL (ref 4.8–10.8)

## 2018-08-07 PROCEDURE — 85025 COMPLETE CBC W/AUTO DIFF WBC: CPT

## 2018-08-07 PROCEDURE — 36415 COLL VENOUS BLD VENIPUNCTURE: CPT

## 2018-08-07 PROCEDURE — 80053 COMPREHEN METABOLIC PANEL: CPT

## 2018-08-15 ENCOUNTER — HOSPITAL ENCOUNTER (OUTPATIENT)
Dept: LAB | Facility: MEDICAL CENTER | Age: 58
End: 2018-08-15
Attending: SPECIALIST
Payer: COMMERCIAL

## 2018-08-15 LAB
APPEARANCE UR: ABNORMAL
APPEARANCE UR: CLEAR
BACTERIA #/AREA URNS HPF: NEGATIVE /HPF
BILIRUB UR QL STRIP.AUTO: NEGATIVE
BILIRUB UR QL STRIP.AUTO: NEGATIVE
CAOX CRY #/AREA URNS HPF: ABNORMAL /HPF
COLOR UR: ABNORMAL
COLOR UR: NORMAL
EPI CELLS #/AREA URNS HPF: ABNORMAL /HPF
GLUCOSE UR STRIP.AUTO-MCNC: NEGATIVE MG/DL
GLUCOSE UR STRIP.AUTO-MCNC: NEGATIVE MG/DL
HYALINE CASTS #/AREA URNS LPF: ABNORMAL /LPF
KETONES UR STRIP.AUTO-MCNC: NEGATIVE MG/DL
KETONES UR STRIP.AUTO-MCNC: NEGATIVE MG/DL
LEUKOCYTE ESTERASE UR QL STRIP.AUTO: NEGATIVE
LEUKOCYTE ESTERASE UR QL STRIP.AUTO: NEGATIVE
MICRO URNS: ABNORMAL
MICRO URNS: NORMAL
NITRITE UR QL STRIP.AUTO: NEGATIVE
NITRITE UR QL STRIP.AUTO: NEGATIVE
PH UR STRIP.AUTO: 5 [PH]
PH UR STRIP.AUTO: 5 [PH]
PROT UR QL STRIP: NEGATIVE MG/DL
PROT UR QL STRIP: NEGATIVE MG/DL
RBC # URNS HPF: ABNORMAL /HPF
RBC UR QL AUTO: NEGATIVE
RBC UR QL AUTO: NEGATIVE
SP GR UR STRIP.AUTO: 1.03
SP GR UR STRIP.AUTO: 1.03
UROBILINOGEN UR STRIP.AUTO-MCNC: 0.2 MG/DL
UROBILINOGEN UR STRIP.AUTO-MCNC: 0.2 MG/DL
WBC #/AREA URNS HPF: ABNORMAL /HPF

## 2018-08-15 PROCEDURE — 36415 COLL VENOUS BLD VENIPUNCTURE: CPT

## 2018-08-15 PROCEDURE — 81001 URINALYSIS AUTO W/SCOPE: CPT

## 2018-08-15 PROCEDURE — 86480 TB TEST CELL IMMUN MEASURE: CPT

## 2018-08-17 LAB
M TB TUBERC IFN-G BLD QL: NEGATIVE
M TB TUBERC IFN-G/MITOGEN IGNF BLD: 0
M TB TUBERC IGNF/MITOGEN IGNF CONTROL: 36.62 [IU]/ML
MITOGEN IGNF BCKGRD COR BLD-ACNC: 0.01 [IU]/ML

## 2018-08-28 ENCOUNTER — HOSPITAL ENCOUNTER (OUTPATIENT)
Dept: RADIOLOGY | Facility: MEDICAL CENTER | Age: 58
End: 2018-08-28
Attending: SPECIALIST
Payer: COMMERCIAL

## 2018-08-28 DIAGNOSIS — G35 MULTIPLE SCLEROSIS (HCC): ICD-10-CM

## 2018-08-28 PROCEDURE — 70553 MRI BRAIN STEM W/O & W/DYE: CPT

## 2018-08-28 PROCEDURE — 72141 MRI NECK SPINE W/O DYE: CPT

## 2018-08-28 PROCEDURE — A9585 GADOBUTROL INJECTION: HCPCS | Performed by: SPECIALIST

## 2018-08-28 PROCEDURE — 700117 HCHG RX CONTRAST REV CODE 255: Performed by: SPECIALIST

## 2018-08-28 RX ORDER — GADOBUTROL 604.72 MG/ML
7.5 INJECTION INTRAVENOUS ONCE
Status: COMPLETED | OUTPATIENT
Start: 2018-08-28 | End: 2018-08-28

## 2018-08-28 RX ADMIN — GADOBUTROL 7.5 ML: 604.72 INJECTION INTRAVENOUS at 08:36

## 2018-10-16 ENCOUNTER — HOSPITAL ENCOUNTER (OUTPATIENT)
Dept: RADIOLOGY | Facility: MEDICAL CENTER | Age: 58
End: 2018-10-16
Attending: SPECIALIST
Payer: COMMERCIAL

## 2018-10-16 DIAGNOSIS — G35 MULTIPLE SCLEROSIS (HCC): ICD-10-CM

## 2018-10-16 PROCEDURE — 700117 HCHG RX CONTRAST REV CODE 255: Performed by: SPECIALIST

## 2018-10-16 PROCEDURE — A9585 GADOBUTROL INJECTION: HCPCS | Performed by: SPECIALIST

## 2018-10-16 PROCEDURE — 72158 MRI LUMBAR SPINE W/O & W/DYE: CPT

## 2018-10-16 PROCEDURE — 72157 MRI CHEST SPINE W/O & W/DYE: CPT

## 2018-10-16 RX ORDER — GADOBUTROL 604.72 MG/ML
7.5 INJECTION INTRAVENOUS ONCE
Status: COMPLETED | OUTPATIENT
Start: 2018-10-16 | End: 2018-10-16

## 2018-10-16 RX ADMIN — GADOBUTROL 7.5 ML: 604.72 INJECTION INTRAVENOUS at 08:03

## 2018-10-19 ENCOUNTER — HOSPITAL ENCOUNTER (OUTPATIENT)
Dept: LAB | Facility: MEDICAL CENTER | Age: 58
End: 2018-10-19
Attending: SPECIALIST
Payer: COMMERCIAL

## 2018-10-19 PROCEDURE — 36415 COLL VENOUS BLD VENIPUNCTURE: CPT

## 2018-10-19 PROCEDURE — 80299 QUANTITATIVE ASSAY DRUG: CPT

## 2018-10-22 LAB — TERIFLUNOMIDE SERPL-MCNC: <0.005 UG/ML

## 2019-01-08 ENCOUNTER — HOSPITAL ENCOUNTER (OUTPATIENT)
Dept: LAB | Facility: MEDICAL CENTER | Age: 59
End: 2019-01-08
Attending: SPECIALIST
Payer: COMMERCIAL

## 2019-01-08 LAB
ALBUMIN SERPL BCP-MCNC: 4.1 G/DL (ref 3.2–4.9)
ALBUMIN/GLOB SERPL: 1.5 G/DL
ALP SERPL-CCNC: 52 U/L (ref 30–99)
ALT SERPL-CCNC: 30 U/L (ref 2–50)
ANION GAP SERPL CALC-SCNC: 9 MMOL/L (ref 0–11.9)
AST SERPL-CCNC: 17 U/L (ref 12–45)
BASOPHILS # BLD AUTO: 0.6 % (ref 0–1.8)
BASOPHILS # BLD: 0.04 K/UL (ref 0–0.12)
BILIRUB SERPL-MCNC: 0.7 MG/DL (ref 0.1–1.5)
BUN SERPL-MCNC: 12 MG/DL (ref 8–22)
CALCIUM SERPL-MCNC: 9.6 MG/DL (ref 8.5–10.5)
CHLORIDE SERPL-SCNC: 108 MMOL/L (ref 96–112)
CO2 SERPL-SCNC: 22 MMOL/L (ref 20–33)
CREAT SERPL-MCNC: 0.99 MG/DL (ref 0.5–1.4)
EOSINOPHIL # BLD AUTO: 0.13 K/UL (ref 0–0.51)
EOSINOPHIL NFR BLD: 1.9 % (ref 0–6.9)
ERYTHROCYTE [DISTWIDTH] IN BLOOD BY AUTOMATED COUNT: 37.6 FL (ref 35.9–50)
GLOBULIN SER CALC-MCNC: 2.7 G/DL (ref 1.9–3.5)
GLUCOSE SERPL-MCNC: 99 MG/DL (ref 65–99)
HCT VFR BLD AUTO: 41.4 % (ref 37–47)
HGB BLD-MCNC: 14.6 G/DL (ref 12–16)
IMM GRANULOCYTES # BLD AUTO: 0.02 K/UL (ref 0–0.11)
IMM GRANULOCYTES NFR BLD AUTO: 0.3 % (ref 0–0.9)
LYMPHOCYTES # BLD AUTO: 1.29 K/UL (ref 1–4.8)
LYMPHOCYTES NFR BLD: 18.9 % (ref 22–41)
MCH RBC QN AUTO: 29.6 PG (ref 27–33)
MCHC RBC AUTO-ENTMCNC: 35.3 G/DL (ref 33.6–35)
MCV RBC AUTO: 84 FL (ref 81.4–97.8)
MONOCYTES # BLD AUTO: 0.41 K/UL (ref 0–0.85)
MONOCYTES NFR BLD AUTO: 6 % (ref 0–13.4)
NEUTROPHILS # BLD AUTO: 4.93 K/UL (ref 2–7.15)
NEUTROPHILS NFR BLD: 72.3 % (ref 44–72)
NRBC # BLD AUTO: 0 K/UL
NRBC BLD-RTO: 0 /100 WBC
PLATELET # BLD AUTO: 248 K/UL (ref 164–446)
PMV BLD AUTO: 9.8 FL (ref 9–12.9)
POTASSIUM SERPL-SCNC: 4 MMOL/L (ref 3.6–5.5)
PROT SERPL-MCNC: 6.8 G/DL (ref 6–8.2)
RBC # BLD AUTO: 4.93 M/UL (ref 4.2–5.4)
SODIUM SERPL-SCNC: 139 MMOL/L (ref 135–145)
WBC # BLD AUTO: 6.8 K/UL (ref 4.8–10.8)

## 2019-01-08 PROCEDURE — 36415 COLL VENOUS BLD VENIPUNCTURE: CPT

## 2019-01-08 PROCEDURE — 80053 COMPREHEN METABOLIC PANEL: CPT

## 2019-01-08 PROCEDURE — 85025 COMPLETE CBC W/AUTO DIFF WBC: CPT

## 2019-02-26 ENCOUNTER — OUTPATIENT INFUSION SERVICES (OUTPATIENT)
Dept: ONCOLOGY | Facility: MEDICAL CENTER | Age: 59
End: 2019-02-26
Attending: SPECIALIST
Payer: COMMERCIAL

## 2019-02-26 VITALS
RESPIRATION RATE: 18 BRPM | TEMPERATURE: 97.2 F | WEIGHT: 158.07 LBS | HEIGHT: 62 IN | SYSTOLIC BLOOD PRESSURE: 123 MMHG | BODY MASS INDEX: 29.09 KG/M2 | OXYGEN SATURATION: 94 % | HEART RATE: 90 BPM | DIASTOLIC BLOOD PRESSURE: 66 MMHG

## 2019-02-26 PROCEDURE — 700111 HCHG RX REV CODE 636 W/ 250 OVERRIDE (IP): Performed by: SPECIALIST

## 2019-02-26 PROCEDURE — 700105 HCHG RX REV CODE 258: Performed by: SPECIALIST

## 2019-02-26 PROCEDURE — 306780 HCHG STAT FOR TRANSFUSION PER CASE

## 2019-02-26 PROCEDURE — 96413 CHEMO IV INFUSION 1 HR: CPT

## 2019-02-26 RX ADMIN — NATALIZUMAB 300 MG: 300 INJECTION INTRAVENOUS at 13:56

## 2019-02-26 NOTE — PROGRESS NOTES
Pt scheduled for Tysabri. Arrived ambulatory w/ rollator, independent; endorsed chronic pain to BLE; denied s/sx infection. Pt educated on medication, possible side effects, infusion process & procedures; questions answered until satisfactory. PIV placed to L-FA; easily flushed, brisk blood return. Infusion & 1-hr post-obs completed w/o adverse effects. PIV flushed, brisk blood return; removed, cath intact. Treatment plan reviewed; print out of future appts given to pt. Pt denied any unmet needs; discharged ambulatory w/ rollator, independent, NAD.

## 2019-03-26 ENCOUNTER — OUTPATIENT INFUSION SERVICES (OUTPATIENT)
Dept: ONCOLOGY | Facility: MEDICAL CENTER | Age: 59
End: 2019-03-26
Attending: SPECIALIST
Payer: COMMERCIAL

## 2019-03-26 VITALS
DIASTOLIC BLOOD PRESSURE: 64 MMHG | BODY MASS INDEX: 29.72 KG/M2 | WEIGHT: 157.41 LBS | HEART RATE: 64 BPM | RESPIRATION RATE: 18 BRPM | TEMPERATURE: 98.1 F | OXYGEN SATURATION: 99 % | HEIGHT: 61 IN | SYSTOLIC BLOOD PRESSURE: 128 MMHG

## 2019-03-26 PROCEDURE — 700111 HCHG RX REV CODE 636 W/ 250 OVERRIDE (IP): Performed by: SPECIALIST

## 2019-03-26 PROCEDURE — 700105 HCHG RX REV CODE 258: Performed by: SPECIALIST

## 2019-03-26 PROCEDURE — 96413 CHEMO IV INFUSION 1 HR: CPT

## 2019-03-26 PROCEDURE — 306780 HCHG STAT FOR TRANSFUSION PER CASE

## 2019-03-26 RX ADMIN — NATALIZUMAB 300 MG: 300 INJECTION INTRAVENOUS at 09:46

## 2019-03-26 NOTE — PROGRESS NOTES
Patient arrives to [facility] for Tysabri infusion.  Patient denies any s/sx of infection.  TOUCH checklist completed.  PIV established to R-AC and positive blood return noted.  Tysabri infused as ordered. Patient monitored for one hour without adverse effects noted.  Patient is scheduled for next infusion.  PIV removed and site wrapped with gauze/coban.  Patient dc home ambulating with 4WW with spouse as transportation.

## 2019-04-23 ENCOUNTER — OUTPATIENT INFUSION SERVICES (OUTPATIENT)
Dept: ONCOLOGY | Facility: MEDICAL CENTER | Age: 59
End: 2019-04-23
Attending: SPECIALIST
Payer: COMMERCIAL

## 2019-04-23 VITALS
TEMPERATURE: 98.3 F | HEIGHT: 62 IN | BODY MASS INDEX: 28.68 KG/M2 | RESPIRATION RATE: 18 BRPM | HEART RATE: 60 BPM | OXYGEN SATURATION: 96 % | SYSTOLIC BLOOD PRESSURE: 123 MMHG | WEIGHT: 155.87 LBS | DIASTOLIC BLOOD PRESSURE: 82 MMHG

## 2019-04-23 PROCEDURE — 700111 HCHG RX REV CODE 636 W/ 250 OVERRIDE (IP): Performed by: SPECIALIST

## 2019-04-23 PROCEDURE — 306780 HCHG STAT FOR TRANSFUSION PER CASE

## 2019-04-23 PROCEDURE — 96413 CHEMO IV INFUSION 1 HR: CPT

## 2019-04-23 PROCEDURE — 700105 HCHG RX REV CODE 258: Performed by: SPECIALIST

## 2019-04-23 RX ADMIN — NATALIZUMAB 300 MG: 300 INJECTION INTRAVENOUS at 10:04

## 2019-04-23 NOTE — PROGRESS NOTES
Pt to infusion services ambulatory per self with walker.  Pt here for scheduled Tysabri infusion.  Plan of care reviewed.  Pt verbalizes understanding.  Pt denies any s/sx of infection today.  TOUCH pre infusion checklist complete; appropriate for Tysabri today.  PIV established to L-AC, #24G.  IV flushes easily; + blood return verified.  Tysabri administered per MD orders.  Tysabri infusing at this time.  Pt resting in chair.

## 2019-04-23 NOTE — PROGRESS NOTES
Tysabri infusion completed without incident.  Line flushed clear with normal saline.  Pt observed for one hour post infusion per protocol.  No adverse effects observed or expressed.  PIV flushed with normal saline; continued + blood return observed.  PIV d/c'd; catheter intact.  Pressure dressing applied.  Pt left infusion services in no apparent distress after completion of treatment, ambulatory with walker and accompanied by daughter.  Pt to return in one month; next appointment scheduled.

## 2019-05-09 ENCOUNTER — HOSPITAL ENCOUNTER (OUTPATIENT)
Dept: LAB | Facility: MEDICAL CENTER | Age: 59
End: 2019-05-09
Attending: SPECIALIST
Payer: COMMERCIAL

## 2019-05-09 ENCOUNTER — HOSPITAL ENCOUNTER (OUTPATIENT)
Dept: RADIOLOGY | Facility: MEDICAL CENTER | Age: 59
End: 2019-05-09
Attending: SPECIALIST
Payer: COMMERCIAL

## 2019-05-09 DIAGNOSIS — M79.605 PAIN IN BOTH LOWER EXTREMITIES: ICD-10-CM

## 2019-05-09 DIAGNOSIS — M79.604 PAIN IN BOTH LOWER EXTREMITIES: ICD-10-CM

## 2019-05-09 DIAGNOSIS — G35 MULTIPLE SCLEROSIS (HCC): ICD-10-CM

## 2019-05-09 LAB
ALBUMIN SERPL BCP-MCNC: 4.5 G/DL (ref 3.2–4.9)
ALBUMIN/GLOB SERPL: 2 G/DL
ALP SERPL-CCNC: 42 U/L (ref 30–99)
ALT SERPL-CCNC: 11 U/L (ref 2–50)
ANION GAP SERPL CALC-SCNC: 8 MMOL/L (ref 0–11.9)
APTT PPP: 30 SEC (ref 24.7–36)
AST SERPL-CCNC: 14 U/L (ref 12–45)
BASOPHILS # BLD AUTO: 0.9 % (ref 0–1.8)
BASOPHILS # BLD: 0.07 K/UL (ref 0–0.12)
BILIRUB SERPL-MCNC: 0.9 MG/DL (ref 0.1–1.5)
BUN SERPL-MCNC: 17 MG/DL (ref 8–22)
CALCIUM SERPL-MCNC: 9.7 MG/DL (ref 8.5–10.5)
CHLORIDE SERPL-SCNC: 108 MMOL/L (ref 96–112)
CK SERPL-CCNC: 61 U/L (ref 0–154)
CO2 SERPL-SCNC: 25 MMOL/L (ref 20–33)
CREAT SERPL-MCNC: 0.95 MG/DL (ref 0.5–1.4)
EOSINOPHIL # BLD AUTO: 0.32 K/UL (ref 0–0.51)
EOSINOPHIL NFR BLD: 3.9 % (ref 0–6.9)
ERYTHROCYTE [DISTWIDTH] IN BLOOD BY AUTOMATED COUNT: 41.3 FL (ref 35.9–50)
ERYTHROCYTE [SEDIMENTATION RATE] IN BLOOD BY WESTERGREN METHOD: <1 MM/HOUR (ref 0–30)
GLOBULIN SER CALC-MCNC: 2.3 G/DL (ref 1.9–3.5)
GLUCOSE SERPL-MCNC: 105 MG/DL (ref 65–99)
HCT VFR BLD AUTO: 39.3 % (ref 37–47)
HGB BLD-MCNC: 13.5 G/DL (ref 12–16)
IMM GRANULOCYTES # BLD AUTO: 0.05 K/UL (ref 0–0.11)
IMM GRANULOCYTES NFR BLD AUTO: 0.6 % (ref 0–0.9)
INR PPP: 0.99 (ref 0.87–1.13)
LYMPHOCYTES # BLD AUTO: 2.07 K/UL (ref 1–4.8)
LYMPHOCYTES NFR BLD: 25.3 % (ref 22–41)
MCH RBC QN AUTO: 29 PG (ref 27–33)
MCHC RBC AUTO-ENTMCNC: 34.4 G/DL (ref 33.6–35)
MCV RBC AUTO: 84.3 FL (ref 81.4–97.8)
MONOCYTES # BLD AUTO: 0.63 K/UL (ref 0–0.85)
MONOCYTES NFR BLD AUTO: 7.7 % (ref 0–13.4)
NEUTROPHILS # BLD AUTO: 5.03 K/UL (ref 2–7.15)
NEUTROPHILS NFR BLD: 61.6 % (ref 44–72)
NRBC # BLD AUTO: 0.03 K/UL
NRBC BLD-RTO: 0.4 /100 WBC
PLATELET # BLD AUTO: 231 K/UL (ref 164–446)
PMV BLD AUTO: 9.6 FL (ref 9–12.9)
POTASSIUM SERPL-SCNC: 3.9 MMOL/L (ref 3.6–5.5)
PROT SERPL-MCNC: 6.8 G/DL (ref 6–8.2)
PROTHROMBIN TIME: 13.2 SEC (ref 12–14.6)
RBC # BLD AUTO: 4.66 M/UL (ref 4.2–5.4)
SODIUM SERPL-SCNC: 141 MMOL/L (ref 135–145)
WBC # BLD AUTO: 8.2 K/UL (ref 4.8–10.8)

## 2019-05-09 PROCEDURE — 86038 ANTINUCLEAR ANTIBODIES: CPT

## 2019-05-09 PROCEDURE — 85025 COMPLETE CBC W/AUTO DIFF WBC: CPT

## 2019-05-09 PROCEDURE — 85652 RBC SED RATE AUTOMATED: CPT

## 2019-05-09 PROCEDURE — 93970 EXTREMITY STUDY: CPT

## 2019-05-09 PROCEDURE — 80053 COMPREHEN METABOLIC PANEL: CPT

## 2019-05-09 PROCEDURE — 85730 THROMBOPLASTIN TIME PARTIAL: CPT

## 2019-05-09 PROCEDURE — 36415 COLL VENOUS BLD VENIPUNCTURE: CPT

## 2019-05-09 PROCEDURE — 82550 ASSAY OF CK (CPK): CPT

## 2019-05-09 PROCEDURE — 85610 PROTHROMBIN TIME: CPT

## 2019-05-10 LAB — NUCLEAR IGG SER QL IA: NORMAL

## 2019-05-21 ENCOUNTER — OUTPATIENT INFUSION SERVICES (OUTPATIENT)
Dept: ONCOLOGY | Facility: MEDICAL CENTER | Age: 59
End: 2019-05-21
Attending: SPECIALIST
Payer: COMMERCIAL

## 2019-05-21 VITALS
DIASTOLIC BLOOD PRESSURE: 69 MMHG | TEMPERATURE: 97.6 F | HEART RATE: 75 BPM | SYSTOLIC BLOOD PRESSURE: 118 MMHG | HEIGHT: 62 IN | RESPIRATION RATE: 18 BRPM | OXYGEN SATURATION: 98 % | WEIGHT: 157.41 LBS | BODY MASS INDEX: 28.97 KG/M2

## 2019-05-21 PROCEDURE — 700111 HCHG RX REV CODE 636 W/ 250 OVERRIDE (IP): Performed by: SPECIALIST

## 2019-05-21 PROCEDURE — 96365 THER/PROPH/DIAG IV INF INIT: CPT

## 2019-05-21 PROCEDURE — 700105 HCHG RX REV CODE 258: Performed by: SPECIALIST

## 2019-05-21 PROCEDURE — 306780 HCHG STAT FOR TRANSFUSION PER CASE

## 2019-05-21 RX ORDER — BACLOFEN 10 MG/1
10 TABLET ORAL 3 TIMES DAILY
COMMUNITY
End: 2019-08-13

## 2019-05-21 RX ADMIN — NATALIZUMAB 300 MG: 300 INJECTION INTRAVENOUS at 08:32

## 2019-05-21 NOTE — PROGRESS NOTES
Pt returns to infusion center for monthly Tysabri infusion.  Pt reports continued benefit from treatment.  Tysabri touch checklist completed.  PIV established, brisk blood return noted.  Pt tolerated infusion without incident.  Monitored for one hour post infusion without incident.  PIV flushed with saline per policy, removed, gauze dressing placed.  Pt left infusion center ambulatory and in good condition.  Returns in four weeks, appointment scheduled.

## 2019-06-18 ENCOUNTER — OUTPATIENT INFUSION SERVICES (OUTPATIENT)
Dept: ONCOLOGY | Facility: MEDICAL CENTER | Age: 59
End: 2019-06-18
Attending: SPECIALIST
Payer: COMMERCIAL

## 2019-06-18 VITALS
HEART RATE: 55 BPM | HEIGHT: 61 IN | WEIGHT: 163.8 LBS | DIASTOLIC BLOOD PRESSURE: 73 MMHG | SYSTOLIC BLOOD PRESSURE: 121 MMHG | BODY MASS INDEX: 30.93 KG/M2 | OXYGEN SATURATION: 94 % | RESPIRATION RATE: 18 BRPM | TEMPERATURE: 97.1 F

## 2019-06-18 DIAGNOSIS — G35 MULTIPLE SCLEROSIS (HCC): ICD-10-CM

## 2019-06-18 PROCEDURE — 96365 THER/PROPH/DIAG IV INF INIT: CPT

## 2019-06-18 PROCEDURE — 700111 HCHG RX REV CODE 636 W/ 250 OVERRIDE (IP): Performed by: SPECIALIST

## 2019-06-18 PROCEDURE — 700105 HCHG RX REV CODE 258: Performed by: SPECIALIST

## 2019-06-18 PROCEDURE — 306780 HCHG STAT FOR TRANSFUSION PER CASE

## 2019-06-18 RX ADMIN — NATALIZUMAB 300 MG: 300 INJECTION INTRAVENOUS at 09:02

## 2019-06-18 NOTE — PROGRESS NOTES
Pt returns for monthly Tysabri. Online TOUCH program questionnaire completed and pt appropriate for infusion today. IV access established. Tysabri infused over one hour with one hour of observation completed. IV flushed and removed. Pressure dressing applied. Pt knows to return in one month. Appt card provided. Pt discharged home under care of daughter in no apparent distress.

## 2019-06-24 ENCOUNTER — HOSPITAL ENCOUNTER (OUTPATIENT)
Dept: LAB | Facility: MEDICAL CENTER | Age: 59
End: 2019-06-24
Attending: SPECIALIST
Payer: COMMERCIAL

## 2019-06-24 LAB
ALBUMIN SERPL BCP-MCNC: 4.3 G/DL (ref 3.2–4.9)
ALBUMIN/GLOB SERPL: 2 G/DL
ALP SERPL-CCNC: 49 U/L (ref 30–99)
ALT SERPL-CCNC: 12 U/L (ref 2–50)
ANION GAP SERPL CALC-SCNC: 6 MMOL/L (ref 0–11.9)
AST SERPL-CCNC: 14 U/L (ref 12–45)
BASOPHILS # BLD AUTO: 0.8 % (ref 0–1.8)
BASOPHILS # BLD: 0.06 K/UL (ref 0–0.12)
BILIRUB SERPL-MCNC: 0.9 MG/DL (ref 0.1–1.5)
BUN SERPL-MCNC: 14 MG/DL (ref 8–22)
CALCIUM SERPL-MCNC: 9.5 MG/DL (ref 8.5–10.5)
CHLORIDE SERPL-SCNC: 104 MMOL/L (ref 96–112)
CO2 SERPL-SCNC: 28 MMOL/L (ref 20–33)
CREAT SERPL-MCNC: 0.91 MG/DL (ref 0.5–1.4)
EOSINOPHIL # BLD AUTO: 0.34 K/UL (ref 0–0.51)
EOSINOPHIL NFR BLD: 4.3 % (ref 0–6.9)
ERYTHROCYTE [DISTWIDTH] IN BLOOD BY AUTOMATED COUNT: 43.5 FL (ref 35.9–50)
FASTING STATUS PATIENT QL REPORTED: NORMAL
GLOBULIN SER CALC-MCNC: 2.2 G/DL (ref 1.9–3.5)
GLUCOSE SERPL-MCNC: 110 MG/DL (ref 65–99)
HCT VFR BLD AUTO: 40.3 % (ref 37–47)
HGB BLD-MCNC: 13.9 G/DL (ref 12–16)
IMM GRANULOCYTES # BLD AUTO: 0.04 K/UL (ref 0–0.11)
IMM GRANULOCYTES NFR BLD AUTO: 0.5 % (ref 0–0.9)
LYMPHOCYTES # BLD AUTO: 1.52 K/UL (ref 1–4.8)
LYMPHOCYTES NFR BLD: 19.3 % (ref 22–41)
MCH RBC QN AUTO: 30.2 PG (ref 27–33)
MCHC RBC AUTO-ENTMCNC: 34.5 G/DL (ref 33.6–35)
MCV RBC AUTO: 87.4 FL (ref 81.4–97.8)
MONOCYTES # BLD AUTO: 0.46 K/UL (ref 0–0.85)
MONOCYTES NFR BLD AUTO: 5.8 % (ref 0–13.4)
NEUTROPHILS # BLD AUTO: 5.47 K/UL (ref 2–7.15)
NEUTROPHILS NFR BLD: 69.3 % (ref 44–72)
NRBC # BLD AUTO: 0.02 K/UL
NRBC BLD-RTO: 0.3 /100 WBC
PLATELET # BLD AUTO: 195 K/UL (ref 164–446)
PMV BLD AUTO: 9.6 FL (ref 9–12.9)
POTASSIUM SERPL-SCNC: 3.9 MMOL/L (ref 3.6–5.5)
PROT SERPL-MCNC: 6.5 G/DL (ref 6–8.2)
RBC # BLD AUTO: 4.61 M/UL (ref 4.2–5.4)
SODIUM SERPL-SCNC: 138 MMOL/L (ref 135–145)
WBC # BLD AUTO: 7.9 K/UL (ref 4.8–10.8)

## 2019-06-24 PROCEDURE — 85025 COMPLETE CBC W/AUTO DIFF WBC: CPT

## 2019-06-24 PROCEDURE — 80053 COMPREHEN METABOLIC PANEL: CPT

## 2019-06-24 PROCEDURE — 36415 COLL VENOUS BLD VENIPUNCTURE: CPT

## 2019-07-16 ENCOUNTER — OUTPATIENT INFUSION SERVICES (OUTPATIENT)
Dept: ONCOLOGY | Facility: MEDICAL CENTER | Age: 59
End: 2019-07-16
Attending: SPECIALIST
Payer: COMMERCIAL

## 2019-07-16 VITALS
HEART RATE: 57 BPM | DIASTOLIC BLOOD PRESSURE: 65 MMHG | WEIGHT: 165.34 LBS | OXYGEN SATURATION: 97 % | RESPIRATION RATE: 18 BRPM | TEMPERATURE: 97.6 F | HEIGHT: 61 IN | SYSTOLIC BLOOD PRESSURE: 122 MMHG | BODY MASS INDEX: 31.22 KG/M2

## 2019-07-16 PROCEDURE — 96365 THER/PROPH/DIAG IV INF INIT: CPT

## 2019-07-16 PROCEDURE — 306780 HCHG STAT FOR TRANSFUSION PER CASE

## 2019-07-16 PROCEDURE — 700105 HCHG RX REV CODE 258: Performed by: SPECIALIST

## 2019-07-16 PROCEDURE — 700111 HCHG RX REV CODE 636 W/ 250 OVERRIDE (IP): Performed by: SPECIALIST

## 2019-07-16 RX ADMIN — NATALIZUMAB 300 MG: 300 INJECTION INTRAVENOUS at 09:32

## 2019-07-16 NOTE — PROGRESS NOTES
Pt arrived to IS, ambulatory with walker, for Tysabri infusion. Pt voices no complaints. Touch online questionnaire completed, pt within parameters to treat today. 24g PIV established in the L-AC, positive blood return noted. Tysabri infused with no s/sx of adverse reaction. 1 hour monitoring completed with no complications. PIV flushed and removed. Pt left IS with no s/sx of distress. Follow up appointment confirmed.

## 2019-08-13 ENCOUNTER — OUTPATIENT INFUSION SERVICES (OUTPATIENT)
Dept: ONCOLOGY | Facility: MEDICAL CENTER | Age: 59
End: 2019-08-13
Attending: SPECIALIST
Payer: COMMERCIAL

## 2019-08-13 VITALS
OXYGEN SATURATION: 98 % | DIASTOLIC BLOOD PRESSURE: 83 MMHG | BODY MASS INDEX: 32.17 KG/M2 | WEIGHT: 170.42 LBS | TEMPERATURE: 98 F | HEART RATE: 63 BPM | SYSTOLIC BLOOD PRESSURE: 120 MMHG | HEIGHT: 61 IN | RESPIRATION RATE: 18 BRPM

## 2019-08-13 PROCEDURE — 700111 HCHG RX REV CODE 636 W/ 250 OVERRIDE (IP): Performed by: SPECIALIST

## 2019-08-13 PROCEDURE — 700105 HCHG RX REV CODE 258: Performed by: SPECIALIST

## 2019-08-13 PROCEDURE — 96365 THER/PROPH/DIAG IV INF INIT: CPT

## 2019-08-13 PROCEDURE — 306780 HCHG STAT FOR TRANSFUSION PER CASE

## 2019-08-13 RX ORDER — BACLOFEN 20 MG/1
20 TABLET ORAL 4 TIMES DAILY
COMMUNITY

## 2019-08-13 RX ADMIN — NATALIZUMAB 300 MG: 300 INJECTION INTRAVENOUS at 09:51

## 2019-09-10 ENCOUNTER — APPOINTMENT (OUTPATIENT)
Dept: ONCOLOGY | Facility: MEDICAL CENTER | Age: 59
End: 2019-09-10
Attending: SPECIALIST
Payer: OTHER MISCELLANEOUS

## 2019-10-01 ENCOUNTER — OUTPATIENT INFUSION SERVICES (OUTPATIENT)
Dept: ONCOLOGY | Facility: MEDICAL CENTER | Age: 59
End: 2019-10-01
Attending: SPECIALIST
Payer: COMMERCIAL

## 2019-10-01 VITALS
OXYGEN SATURATION: 92 % | DIASTOLIC BLOOD PRESSURE: 70 MMHG | SYSTOLIC BLOOD PRESSURE: 124 MMHG | TEMPERATURE: 97.1 F | WEIGHT: 175.27 LBS | RESPIRATION RATE: 18 BRPM | HEART RATE: 76 BPM | BODY MASS INDEX: 33.09 KG/M2 | HEIGHT: 61 IN

## 2019-10-01 PROCEDURE — 96365 THER/PROPH/DIAG IV INF INIT: CPT

## 2019-10-01 PROCEDURE — 306780 HCHG STAT FOR TRANSFUSION PER CASE

## 2019-10-01 PROCEDURE — 700105 HCHG RX REV CODE 258: Performed by: SPECIALIST

## 2019-10-01 PROCEDURE — 700111 HCHG RX REV CODE 636 W/ 250 OVERRIDE (IP): Performed by: SPECIALIST

## 2019-10-01 RX ADMIN — NATALIZUMAB 300 MG: 300 INJECTION INTRAVENOUS at 11:26

## 2019-10-01 NOTE — PROGRESS NOTES
Pt returns to [facility] for Tysabri infusion.  Pt denies any s/sx of infection or worsening symptoms of MS. TOUCH online checklist completed.  PIV established.  Tysabri infused as ordered.  One hour observation completed without adverse effects noted.  PIV removed and site wrapped with gauze and coban.  Gave pt a copy of updated schedule.  Pt dc home to self care.

## 2019-10-29 ENCOUNTER — APPOINTMENT (OUTPATIENT)
Dept: ONCOLOGY | Facility: MEDICAL CENTER | Age: 59
End: 2019-10-29
Attending: SPECIALIST
Payer: COMMERCIAL

## 2019-11-26 ENCOUNTER — OUTPATIENT INFUSION SERVICES (OUTPATIENT)
Dept: ONCOLOGY | Facility: MEDICAL CENTER | Age: 59
End: 2019-11-26
Attending: SPECIALIST
Payer: COMMERCIAL

## 2019-11-26 VITALS
HEART RATE: 69 BPM | SYSTOLIC BLOOD PRESSURE: 122 MMHG | OXYGEN SATURATION: 95 % | BODY MASS INDEX: 34.76 KG/M2 | RESPIRATION RATE: 18 BRPM | TEMPERATURE: 98 F | WEIGHT: 184.08 LBS | DIASTOLIC BLOOD PRESSURE: 74 MMHG | HEIGHT: 61 IN

## 2019-11-26 PROCEDURE — 306780 HCHG STAT FOR TRANSFUSION PER CASE

## 2019-11-26 PROCEDURE — 96365 THER/PROPH/DIAG IV INF INIT: CPT

## 2019-11-26 PROCEDURE — 700105 HCHG RX REV CODE 258: Performed by: SPECIALIST

## 2019-11-26 PROCEDURE — 700111 HCHG RX REV CODE 636 W/ 250 OVERRIDE (IP): Performed by: SPECIALIST

## 2019-11-26 RX ADMIN — NATALIZUMAB 300 MG: 300 INJECTION INTRAVENOUS at 09:12

## 2019-11-26 NOTE — PROGRESS NOTES
Pt arrived ambulatory to  for monthly Tysabri infusion for MS.  Pt missed last month due to having the flu.  She reports feeling better, no recent recent fever/chills or active infections.  TOUCH online questionnaire completed, pt appropriate for treatment today.  PIV started to LAC, blood return confirmed.  Tysabri infused as ordered followed by one hour observation period.  No adverse reaction noted.  PIV flushed, removed, and site covered with gauze and coban.  Next appointment confirmed.  Pt discharged from  in NAD under self care.

## 2019-12-10 ENCOUNTER — HOSPITAL ENCOUNTER (OUTPATIENT)
Dept: LAB | Facility: MEDICAL CENTER | Age: 59
End: 2019-12-10
Attending: SPECIALIST
Payer: COMMERCIAL

## 2019-12-10 LAB
ALBUMIN SERPL BCP-MCNC: 4.4 G/DL (ref 3.2–4.9)
ALBUMIN/GLOB SERPL: 2.4 G/DL
ALP SERPL-CCNC: 57 U/L (ref 30–99)
ALT SERPL-CCNC: 19 U/L (ref 2–50)
ANION GAP SERPL CALC-SCNC: 7 MMOL/L (ref 0–11.9)
AST SERPL-CCNC: 17 U/L (ref 12–45)
BASOPHILS # BLD AUTO: 1.2 % (ref 0–1.8)
BASOPHILS # BLD: 0.11 K/UL (ref 0–0.12)
BILIRUB SERPL-MCNC: 0.7 MG/DL (ref 0.1–1.5)
BUN SERPL-MCNC: 18 MG/DL (ref 8–22)
CALCIUM SERPL-MCNC: 9 MG/DL (ref 8.5–10.5)
CHLORIDE SERPL-SCNC: 105 MMOL/L (ref 96–112)
CO2 SERPL-SCNC: 28 MMOL/L (ref 20–33)
CREAT SERPL-MCNC: 0.96 MG/DL (ref 0.5–1.4)
EOSINOPHIL # BLD AUTO: 1.31 K/UL (ref 0–0.51)
EOSINOPHIL NFR BLD: 14.2 % (ref 0–6.9)
ERYTHROCYTE [DISTWIDTH] IN BLOOD BY AUTOMATED COUNT: 44.9 FL (ref 35.9–50)
GLOBULIN SER CALC-MCNC: 1.8 G/DL (ref 1.9–3.5)
GLUCOSE SERPL-MCNC: 97 MG/DL (ref 65–99)
HCT VFR BLD AUTO: 37.8 % (ref 37–47)
HGB BLD-MCNC: 12.9 G/DL (ref 12–16)
IMM GRANULOCYTES # BLD AUTO: 0.06 K/UL (ref 0–0.11)
IMM GRANULOCYTES NFR BLD AUTO: 0.7 % (ref 0–0.9)
LYMPHOCYTES # BLD AUTO: 1.66 K/UL (ref 1–4.8)
LYMPHOCYTES NFR BLD: 18 % (ref 22–41)
MCH RBC QN AUTO: 29.9 PG (ref 27–33)
MCHC RBC AUTO-ENTMCNC: 34.1 G/DL (ref 33.6–35)
MCV RBC AUTO: 87.7 FL (ref 81.4–97.8)
MONOCYTES # BLD AUTO: 0.56 K/UL (ref 0–0.85)
MONOCYTES NFR BLD AUTO: 6.1 % (ref 0–13.4)
NEUTROPHILS # BLD AUTO: 5.51 K/UL (ref 2–7.15)
NEUTROPHILS NFR BLD: 59.8 % (ref 44–72)
NRBC # BLD AUTO: 0.03 K/UL
NRBC BLD-RTO: 0.3 /100 WBC
PLATELET # BLD AUTO: 174 K/UL (ref 164–446)
PMV BLD AUTO: 9.7 FL (ref 9–12.9)
POTASSIUM SERPL-SCNC: 4.2 MMOL/L (ref 3.6–5.5)
PROT SERPL-MCNC: 6.2 G/DL (ref 6–8.2)
RBC # BLD AUTO: 4.31 M/UL (ref 4.2–5.4)
SODIUM SERPL-SCNC: 140 MMOL/L (ref 135–145)
WBC # BLD AUTO: 9.2 K/UL (ref 4.8–10.8)

## 2019-12-10 PROCEDURE — 85025 COMPLETE CBC W/AUTO DIFF WBC: CPT

## 2019-12-10 PROCEDURE — 36415 COLL VENOUS BLD VENIPUNCTURE: CPT

## 2019-12-10 PROCEDURE — 80053 COMPREHEN METABOLIC PANEL: CPT

## 2019-12-20 ENCOUNTER — HOSPITAL ENCOUNTER (OUTPATIENT)
Dept: RADIOLOGY | Facility: MEDICAL CENTER | Age: 59
End: 2019-12-20
Attending: SPECIALIST
Payer: COMMERCIAL

## 2019-12-20 DIAGNOSIS — G35 MULTIPLE SCLEROSIS (HCC): ICD-10-CM

## 2019-12-20 PROCEDURE — A9576 INJ PROHANCE MULTIPACK: HCPCS | Performed by: SPECIALIST

## 2019-12-20 PROCEDURE — 700117 HCHG RX CONTRAST REV CODE 255: Performed by: SPECIALIST

## 2019-12-20 PROCEDURE — 72156 MRI NECK SPINE W/O & W/DYE: CPT

## 2019-12-20 PROCEDURE — 70553 MRI BRAIN STEM W/O & W/DYE: CPT

## 2019-12-20 RX ADMIN — GADOTERIDOL 15 ML: 279.3 INJECTION, SOLUTION INTRAVENOUS at 14:57

## 2019-12-24 ENCOUNTER — OUTPATIENT INFUSION SERVICES (OUTPATIENT)
Dept: ONCOLOGY | Facility: MEDICAL CENTER | Age: 59
End: 2019-12-24
Attending: SPECIALIST
Payer: COMMERCIAL

## 2019-12-24 VITALS
WEIGHT: 182.98 LBS | DIASTOLIC BLOOD PRESSURE: 67 MMHG | SYSTOLIC BLOOD PRESSURE: 122 MMHG | HEIGHT: 62 IN | TEMPERATURE: 98 F | RESPIRATION RATE: 18 BRPM | HEART RATE: 58 BPM | OXYGEN SATURATION: 95 % | BODY MASS INDEX: 33.67 KG/M2

## 2019-12-24 PROCEDURE — 700105 HCHG RX REV CODE 258: Performed by: SPECIALIST

## 2019-12-24 PROCEDURE — 96365 THER/PROPH/DIAG IV INF INIT: CPT

## 2019-12-24 PROCEDURE — 306780 HCHG STAT FOR TRANSFUSION PER CASE

## 2019-12-24 PROCEDURE — 700111 HCHG RX REV CODE 636 W/ 250 OVERRIDE (IP): Performed by: SPECIALIST

## 2019-12-24 RX ADMIN — NATALIZUMAB 300 MG: 300 INJECTION INTRAVENOUS at 09:24

## 2019-12-24 NOTE — PROGRESS NOTES
Lisa here for tysabri infusion. Patient reports no new or changed symptoms. TOUCH pre-infusion patient checklist completed online. Tysabri infused as ordered. Lisa tolerated well. Observed by RN for one hour after infusion finished. No signed or symptoms of adverse reaction observed or expressed. Next appointment scheduled, discharged home to self care.

## 2020-01-01 ENCOUNTER — HOSPITAL ENCOUNTER (OUTPATIENT)
Dept: LAB | Facility: MEDICAL CENTER | Age: 60
End: 2020-09-08
Attending: SPECIALIST
Payer: COMMERCIAL

## 2020-01-01 ENCOUNTER — HOSPITAL ENCOUNTER (OUTPATIENT)
Dept: LAB | Facility: MEDICAL CENTER | Age: 60
End: 2020-06-10
Attending: SPECIALIST
Payer: COMMERCIAL

## 2020-01-01 LAB
ALBUMIN SERPL BCP-MCNC: 4.5 G/DL (ref 3.2–4.9)
ALBUMIN SERPL BCP-MCNC: 4.6 G/DL (ref 3.2–4.9)
ALBUMIN/GLOB SERPL: 2 G/DL
ALBUMIN/GLOB SERPL: 2.3 G/DL
ALP SERPL-CCNC: 44 U/L (ref 30–99)
ALP SERPL-CCNC: 51 U/L (ref 30–99)
ALT SERPL-CCNC: 15 U/L (ref 2–50)
ALT SERPL-CCNC: 17 U/L (ref 2–50)
ANION GAP SERPL CALC-SCNC: 12 MMOL/L (ref 7–16)
ANION GAP SERPL CALC-SCNC: 15 MMOL/L (ref 7–16)
AST SERPL-CCNC: 16 U/L (ref 12–45)
AST SERPL-CCNC: 21 U/L (ref 12–45)
BASOPHILS # BLD AUTO: 0.4 % (ref 0–1.8)
BASOPHILS # BLD AUTO: 0.9 % (ref 0–1.8)
BASOPHILS # BLD: 0.03 K/UL (ref 0–0.12)
BASOPHILS # BLD: 0.05 K/UL (ref 0–0.12)
BILIRUB SERPL-MCNC: 0.4 MG/DL (ref 0.1–1.5)
BILIRUB SERPL-MCNC: 0.7 MG/DL (ref 0.1–1.5)
BUN SERPL-MCNC: 10 MG/DL (ref 8–22)
BUN SERPL-MCNC: 13 MG/DL (ref 8–22)
CALCIUM SERPL-MCNC: 9.2 MG/DL (ref 8.5–10.5)
CALCIUM SERPL-MCNC: 9.3 MG/DL (ref 8.5–10.5)
CHLORIDE SERPL-SCNC: 102 MMOL/L (ref 96–112)
CHLORIDE SERPL-SCNC: 103 MMOL/L (ref 96–112)
CO2 SERPL-SCNC: 23 MMOL/L (ref 20–33)
CO2 SERPL-SCNC: 25 MMOL/L (ref 20–33)
CREAT SERPL-MCNC: 0.81 MG/DL (ref 0.5–1.4)
CREAT SERPL-MCNC: 0.91 MG/DL (ref 0.5–1.4)
EOSINOPHIL # BLD AUTO: 0.44 K/UL (ref 0–0.51)
EOSINOPHIL # BLD AUTO: 0.57 K/UL (ref 0–0.51)
EOSINOPHIL NFR BLD: 7.5 % (ref 0–6.9)
EOSINOPHIL NFR BLD: 8.5 % (ref 0–6.9)
ERYTHROCYTE [DISTWIDTH] IN BLOOD BY AUTOMATED COUNT: 42.5 FL (ref 35.9–50)
ERYTHROCYTE [DISTWIDTH] IN BLOOD BY AUTOMATED COUNT: 43.8 FL (ref 35.9–50)
GAMMA INTERFERON BACKGROUND BLD IA-ACNC: 0.01 IU/ML
GLOBULIN SER CALC-MCNC: 2 G/DL (ref 1.9–3.5)
GLOBULIN SER CALC-MCNC: 2.2 G/DL (ref 1.9–3.5)
GLUCOSE SERPL-MCNC: 103 MG/DL (ref 65–99)
GLUCOSE SERPL-MCNC: 81 MG/DL (ref 65–99)
HCT VFR BLD AUTO: 41.8 % (ref 37–47)
HCT VFR BLD AUTO: 43 % (ref 37–47)
HGB BLD-MCNC: 13.8 G/DL (ref 12–16)
HGB BLD-MCNC: 14.5 G/DL (ref 12–16)
IMM GRANULOCYTES # BLD AUTO: 0.02 K/UL (ref 0–0.11)
IMM GRANULOCYTES # BLD AUTO: 0.03 K/UL (ref 0–0.11)
IMM GRANULOCYTES NFR BLD AUTO: 0.3 % (ref 0–0.9)
IMM GRANULOCYTES NFR BLD AUTO: 0.5 % (ref 0–0.9)
LYMPHOCYTES # BLD AUTO: 1.04 K/UL (ref 1–4.8)
LYMPHOCYTES # BLD AUTO: 1.23 K/UL (ref 1–4.8)
LYMPHOCYTES NFR BLD: 17.8 % (ref 22–41)
LYMPHOCYTES NFR BLD: 18.3 % (ref 22–41)
M TB IFN-G BLD-IMP: NEGATIVE
M TB IFN-G CD4+ BCKGRND COR BLD-ACNC: 0 IU/ML
MCH RBC QN AUTO: 29.7 PG (ref 27–33)
MCH RBC QN AUTO: 29.9 PG (ref 27–33)
MCHC RBC AUTO-ENTMCNC: 33 G/DL (ref 33.6–35)
MCHC RBC AUTO-ENTMCNC: 33.7 G/DL (ref 33.6–35)
MCV RBC AUTO: 88.1 FL (ref 81.4–97.8)
MCV RBC AUTO: 90.5 FL (ref 81.4–97.8)
MITOGEN IGNF BCKGRD COR BLD-ACNC: >10 IU/ML
MONOCYTES # BLD AUTO: 0.31 K/UL (ref 0–0.85)
MONOCYTES # BLD AUTO: 0.41 K/UL (ref 0–0.85)
MONOCYTES NFR BLD AUTO: 5.3 % (ref 0–13.4)
MONOCYTES NFR BLD AUTO: 6.1 % (ref 0–13.4)
NEUTROPHILS # BLD AUTO: 3.98 K/UL (ref 2–7.15)
NEUTROPHILS # BLD AUTO: 4.47 K/UL (ref 2–7.15)
NEUTROPHILS NFR BLD: 66.4 % (ref 44–72)
NEUTROPHILS NFR BLD: 68 % (ref 44–72)
NRBC # BLD AUTO: 0 K/UL
NRBC # BLD AUTO: 0 K/UL
NRBC BLD-RTO: 0 /100 WBC
NRBC BLD-RTO: 0 /100 WBC
PLATELET # BLD AUTO: 192 K/UL (ref 164–446)
PLATELET # BLD AUTO: 196 K/UL (ref 164–446)
PMV BLD AUTO: 10.2 FL (ref 9–12.9)
PMV BLD AUTO: 10.4 FL (ref 9–12.9)
POTASSIUM SERPL-SCNC: 4.1 MMOL/L (ref 3.6–5.5)
POTASSIUM SERPL-SCNC: 4.1 MMOL/L (ref 3.6–5.5)
PROT SERPL-MCNC: 6.6 G/DL (ref 6–8.2)
PROT SERPL-MCNC: 6.7 G/DL (ref 6–8.2)
QFT TB2 - NIL TBQ2: 0 IU/ML
RBC # BLD AUTO: 4.62 M/UL (ref 4.2–5.4)
RBC # BLD AUTO: 4.88 M/UL (ref 4.2–5.4)
SODIUM SERPL-SCNC: 140 MMOL/L (ref 135–145)
SODIUM SERPL-SCNC: 140 MMOL/L (ref 135–145)
WBC # BLD AUTO: 5.9 K/UL (ref 4.8–10.8)
WBC # BLD AUTO: 6.7 K/UL (ref 4.8–10.8)

## 2020-01-01 PROCEDURE — 36415 COLL VENOUS BLD VENIPUNCTURE: CPT

## 2020-01-01 PROCEDURE — 86480 TB TEST CELL IMMUN MEASURE: CPT

## 2020-01-01 PROCEDURE — 85025 COMPLETE CBC W/AUTO DIFF WBC: CPT

## 2020-01-01 PROCEDURE — 80053 COMPREHEN METABOLIC PANEL: CPT

## 2020-01-21 ENCOUNTER — OUTPATIENT INFUSION SERVICES (OUTPATIENT)
Dept: ONCOLOGY | Facility: MEDICAL CENTER | Age: 60
End: 2020-01-21
Attending: SPECIALIST
Payer: COMMERCIAL

## 2020-01-21 VITALS
WEIGHT: 196.21 LBS | BODY MASS INDEX: 36.11 KG/M2 | RESPIRATION RATE: 18 BRPM | DIASTOLIC BLOOD PRESSURE: 63 MMHG | SYSTOLIC BLOOD PRESSURE: 120 MMHG | HEIGHT: 62 IN | HEART RATE: 57 BPM | OXYGEN SATURATION: 94 % | TEMPERATURE: 98.8 F

## 2020-01-21 PROCEDURE — 700105 HCHG RX REV CODE 258: Performed by: SPECIALIST

## 2020-01-21 PROCEDURE — 96365 THER/PROPH/DIAG IV INF INIT: CPT

## 2020-01-21 PROCEDURE — 306780 HCHG STAT FOR TRANSFUSION PER CASE

## 2020-01-21 PROCEDURE — 700111 HCHG RX REV CODE 636 W/ 250 OVERRIDE (IP): Performed by: SPECIALIST

## 2020-01-21 RX ADMIN — NATALIZUMAB 300 MG: 300 INJECTION INTRAVENOUS at 09:18

## 2020-01-21 NOTE — PROGRESS NOTES
PT to [facility] to Tysabri infusion accompanied by self.   Pt alert and in no acute distress.  Discussed POC with patient and Pt agrees with plan. JPT reports no new or changed symptoms.  TOUCH pre-infusion patient checklist completed online. PIV started left AC x 1 attempt,  flushes easily with brisk blood return.  Tysabri infusion started to run over 1 hour (see MAR).  PT monitored 1 hour after Tysabri infusion completed.  Pt tolerated all treatments well without reaction.  Pt home with self.  Will return on 02/18/2020 for next appointment.

## 2020-02-18 ENCOUNTER — OUTPATIENT INFUSION SERVICES (OUTPATIENT)
Dept: ONCOLOGY | Facility: MEDICAL CENTER | Age: 60
End: 2020-02-18
Attending: SPECIALIST
Payer: COMMERCIAL

## 2020-02-18 VITALS
HEART RATE: 78 BPM | OXYGEN SATURATION: 98 % | HEIGHT: 61 IN | TEMPERATURE: 97.8 F | RESPIRATION RATE: 18 BRPM | SYSTOLIC BLOOD PRESSURE: 132 MMHG | BODY MASS INDEX: 36.8 KG/M2 | WEIGHT: 194.89 LBS | DIASTOLIC BLOOD PRESSURE: 78 MMHG

## 2020-02-18 PROCEDURE — 96365 THER/PROPH/DIAG IV INF INIT: CPT

## 2020-02-18 PROCEDURE — 306780 HCHG STAT FOR TRANSFUSION PER CASE

## 2020-02-18 PROCEDURE — 700105 HCHG RX REV CODE 258: Performed by: SPECIALIST

## 2020-02-18 PROCEDURE — 700111 HCHG RX REV CODE 636 W/ 250 OVERRIDE (IP): Performed by: SPECIALIST

## 2020-02-18 RX ADMIN — NATALIZUMAB 300 MG: 300 INJECTION INTRAVENOUS at 08:41

## 2020-02-18 NOTE — PROGRESS NOTES
Pt arrived ambulatory to  for monthly Tysabri infusion for MS.  POC discussed.  Pt denies fever/chills or active infections.  TOUCH online questionnaire completed, pt appropriate for treatment today.  PIV started to LAC, blood return confirmed.  Tysabri infused as ordered followed by one hour observation period.  No adverse reaction noted.  PIV flushed, removed, and site covered with gauze and coban.  Next appointment confirmed.  Pt reports she cancelled next month (March) due to transportation issues.  Pt discharged from IS in [facility] under self care.

## 2021-01-01 ENCOUNTER — HOSPITAL ENCOUNTER (INPATIENT)
Facility: MEDICAL CENTER | Age: 61
LOS: 2 days | DRG: 058 | End: 2021-05-22
Attending: INTERNAL MEDICINE | Admitting: INTERNAL MEDICINE
Payer: COMMERCIAL

## 2021-01-01 ENCOUNTER — APPOINTMENT (OUTPATIENT)
Dept: RADIOLOGY | Facility: MEDICAL CENTER | Age: 61
DRG: 058 | End: 2021-01-01
Attending: INTERNAL MEDICINE
Payer: COMMERCIAL

## 2021-01-01 ENCOUNTER — HOSPITAL ENCOUNTER (OUTPATIENT)
Dept: RADIOLOGY | Facility: MEDICAL CENTER | Age: 61
End: 2021-05-21

## 2021-01-01 ENCOUNTER — APPOINTMENT (OUTPATIENT)
Dept: RADIOLOGY | Facility: MEDICAL CENTER | Age: 61
DRG: 058 | End: 2021-01-01
Attending: SPECIALIST
Payer: COMMERCIAL

## 2021-01-01 ENCOUNTER — HOSPITAL ENCOUNTER (OUTPATIENT)
Dept: LAB | Facility: MEDICAL CENTER | Age: 61
End: 2021-02-17
Attending: SPECIALIST
Payer: COMMERCIAL

## 2021-01-01 ENCOUNTER — APPOINTMENT (OUTPATIENT)
Dept: RADIOLOGY | Facility: MEDICAL CENTER | Age: 61
DRG: 058 | End: 2021-01-01
Attending: STUDENT IN AN ORGANIZED HEALTH CARE EDUCATION/TRAINING PROGRAM
Payer: COMMERCIAL

## 2021-01-01 VITALS — HEIGHT: 63 IN | WEIGHT: 201.72 LBS | BODY MASS INDEX: 35.74 KG/M2

## 2021-01-01 DIAGNOSIS — J96.01 ACUTE RESPIRATORY FAILURE WITH HYPOXIA (HCC): ICD-10-CM

## 2021-01-01 DIAGNOSIS — Z23 NEED FOR VACCINATION: ICD-10-CM

## 2021-01-01 DIAGNOSIS — G93.40 ACUTE ENCEPHALOPATHY: ICD-10-CM

## 2021-01-01 DIAGNOSIS — R41.82 ALTERED MENTAL STATUS, UNSPECIFIED ALTERED MENTAL STATUS TYPE: ICD-10-CM

## 2021-01-01 DIAGNOSIS — G35 MULTIPLE SCLEROSIS (HCC): ICD-10-CM

## 2021-01-01 DIAGNOSIS — Z71.89 GOALS OF CARE, COUNSELING/DISCUSSION: ICD-10-CM

## 2021-01-01 LAB
ALBUMIN SERPL BCP-MCNC: 3.6 G/DL (ref 3.2–4.9)
ALBUMIN SERPL BCP-MCNC: 4.6 G/DL (ref 3.2–4.9)
ALBUMIN/GLOB SERPL: 1.6 G/DL
ALBUMIN/GLOB SERPL: 1.8 G/DL
ALP SERPL-CCNC: 44 U/L (ref 30–99)
ALP SERPL-CCNC: 48 U/L (ref 30–99)
ALT SERPL-CCNC: 40 U/L (ref 2–50)
ALT SERPL-CCNC: 41 U/L (ref 2–50)
ANION GAP SERPL CALC-SCNC: 12 MMOL/L (ref 7–16)
ANION GAP SERPL CALC-SCNC: 5 MMOL/L (ref 7–16)
ANION GAP SERPL CALC-SCNC: 7 MMOL/L (ref 7–16)
ANION GAP SERPL CALC-SCNC: 9 MMOL/L (ref 7–16)
AST SERPL-CCNC: 33 U/L (ref 12–45)
AST SERPL-CCNC: 38 U/L (ref 12–45)
BASE EXCESS BLDA CALC-SCNC: -1 MMOL/L (ref -4–3)
BASE EXCESS BLDA CALC-SCNC: -4 MMOL/L (ref -4–3)
BASOPHILS # BLD AUTO: 0.1 % (ref 0–1.8)
BASOPHILS # BLD AUTO: 0.1 % (ref 0–1.8)
BASOPHILS # BLD AUTO: 0.6 % (ref 0–1.8)
BASOPHILS # BLD: 0.01 K/UL (ref 0–0.12)
BASOPHILS # BLD: 0.01 K/UL (ref 0–0.12)
BASOPHILS # BLD: 0.05 K/UL (ref 0–0.12)
BILIRUB SERPL-MCNC: 0.7 MG/DL (ref 0.1–1.5)
BILIRUB SERPL-MCNC: 0.7 MG/DL (ref 0.1–1.5)
BODY TEMPERATURE: ABNORMAL DEGREES
BODY TEMPERATURE: ABNORMAL DEGREES
BUN SERPL-MCNC: 11 MG/DL (ref 8–22)
BUN SERPL-MCNC: 11 MG/DL (ref 8–22)
BUN SERPL-MCNC: 20 MG/DL (ref 8–22)
BUN SERPL-MCNC: 9 MG/DL (ref 8–22)
BURR CELLS/RBC NFR CSF MANUAL: 0 %
C GATTII+NEOFOR DNA CSF QL NAA+NON-PROBE: NOT DETECTED
CALCIUM SERPL-MCNC: 7.5 MG/DL (ref 8.5–10.5)
CALCIUM SERPL-MCNC: 7.6 MG/DL (ref 8.5–10.5)
CALCIUM SERPL-MCNC: 8.4 MG/DL (ref 8.5–10.5)
CALCIUM SERPL-MCNC: 9.3 MG/DL (ref 8.5–10.5)
CHLORIDE SERPL-SCNC: 101 MMOL/L (ref 96–112)
CHLORIDE SERPL-SCNC: 108 MMOL/L (ref 96–112)
CHLORIDE SERPL-SCNC: 110 MMOL/L (ref 96–112)
CHLORIDE SERPL-SCNC: 111 MMOL/L (ref 96–112)
CK SERPL-CCNC: 162 U/L (ref 0–154)
CLARITY CSF: CLEAR
CMV DNA CSF QL NAA+NON-PROBE: NOT DETECTED
CO2 BLDA-SCNC: 19 MMOL/L (ref 20–33)
CO2 BLDA-SCNC: 22 MMOL/L (ref 20–33)
CO2 SERPL-SCNC: 20 MMOL/L (ref 20–33)
CO2 SERPL-SCNC: 21 MMOL/L (ref 20–33)
CO2 SERPL-SCNC: 22 MMOL/L (ref 20–33)
CO2 SERPL-SCNC: 27 MMOL/L (ref 20–33)
COLOR CSF: COLORLESS
COLOR SPUN CSF: COLORLESS
CORTIS SERPL-MCNC: 25.1 UG/DL (ref 0–23)
CORTIS SERPL-MCNC: 9.6 UG/DL (ref 0–23)
CREAT SERPL-MCNC: 0.83 MG/DL (ref 0.5–1.4)
CREAT SERPL-MCNC: 0.9 MG/DL (ref 0.5–1.4)
CREAT SERPL-MCNC: 0.95 MG/DL (ref 0.5–1.4)
CREAT SERPL-MCNC: 1.03 MG/DL (ref 0.5–1.4)
CSF COMMENTS 1658: NORMAL
DELSYS IDSYS: ABNORMAL
DELSYS IDSYS: ABNORMAL
E COLI K1 DNA CSF QL NAA+NON-PROBE: NOT DETECTED
END TIDAL CARBON DIOXIDE IECO2: 22 MMHG
END TIDAL CARBON DIOXIDE IECO2: 23 MMHG
EOSINOPHIL # BLD AUTO: 0 K/UL (ref 0–0.51)
EOSINOPHIL # BLD AUTO: 0.03 K/UL (ref 0–0.51)
EOSINOPHIL # BLD AUTO: 0.51 K/UL (ref 0–0.51)
EOSINOPHIL NFR BLD: 0 % (ref 0–6.9)
EOSINOPHIL NFR BLD: 0.4 % (ref 0–6.9)
EOSINOPHIL NFR BLD: 6.2 % (ref 0–6.9)
ERYTHROCYTE [DISTWIDTH] IN BLOOD BY AUTOMATED COUNT: 43.7 FL (ref 35.9–50)
ERYTHROCYTE [DISTWIDTH] IN BLOOD BY AUTOMATED COUNT: 46.8 FL (ref 35.9–50)
ERYTHROCYTE [DISTWIDTH] IN BLOOD BY AUTOMATED COUNT: 48.2 FL (ref 35.9–50)
EV RNA CSF QL NAA+NON-PROBE: NOT DETECTED
GLOBULIN SER CALC-MCNC: 2.2 G/DL (ref 1.9–3.5)
GLOBULIN SER CALC-MCNC: 2.5 G/DL (ref 1.9–3.5)
GLUCOSE BLD-MCNC: 107 MG/DL (ref 65–99)
GLUCOSE BLD-MCNC: 111 MG/DL (ref 65–99)
GLUCOSE BLD-MCNC: 117 MG/DL (ref 65–99)
GLUCOSE BLD-MCNC: 128 MG/DL (ref 65–99)
GLUCOSE BLD-MCNC: 129 MG/DL (ref 65–99)
GLUCOSE BLD-MCNC: 132 MG/DL (ref 65–99)
GLUCOSE BLD-MCNC: 137 MG/DL (ref 65–99)
GLUCOSE CSF-MCNC: 77 MG/DL (ref 40–80)
GLUCOSE SERPL-MCNC: 113 MG/DL (ref 65–99)
GLUCOSE SERPL-MCNC: 120 MG/DL (ref 65–99)
GLUCOSE SERPL-MCNC: 131 MG/DL (ref 65–99)
GLUCOSE SERPL-MCNC: 86 MG/DL (ref 65–99)
GP B STREP DNA CSF QL NAA+NON-PROBE: NOT DETECTED
GRAM STN SPEC: NORMAL
HAEM INFLU DNA CSF QL NAA+NON-PROBE: NOT DETECTED
HCO3 BLDA-SCNC: 17.9 MMOL/L (ref 17–25)
HCO3 BLDA-SCNC: 20.9 MMOL/L (ref 17–25)
HCT VFR BLD AUTO: 27.5 % (ref 37–47)
HCT VFR BLD AUTO: 30.3 % (ref 37–47)
HCT VFR BLD AUTO: 44.2 % (ref 37–47)
HGB BLD-MCNC: 10.4 G/DL (ref 12–16)
HGB BLD-MCNC: 14.8 G/DL (ref 12–16)
HGB BLD-MCNC: 9.4 G/DL (ref 12–16)
HHV6 DNA CSF QL NAA+NON-PROBE: NOT DETECTED
HOROWITZ INDEX BLDA+IHG-RTO: 220 MM[HG]
HOROWITZ INDEX BLDA+IHG-RTO: 337 MM[HG]
HSV1 DNA CSF QL NAA+NON-PROBE: NOT DETECTED
HSV2 DNA CSF QL NAA+NON-PROBE: NOT DETECTED
IMM GRANULOCYTES # BLD AUTO: 0.03 K/UL (ref 0–0.11)
IMM GRANULOCYTES # BLD AUTO: 0.04 K/UL (ref 0–0.11)
IMM GRANULOCYTES # BLD AUTO: 0.06 K/UL (ref 0–0.11)
IMM GRANULOCYTES NFR BLD AUTO: 0.4 % (ref 0–0.9)
IMM GRANULOCYTES NFR BLD AUTO: 0.5 % (ref 0–0.9)
IMM GRANULOCYTES NFR BLD AUTO: 0.7 % (ref 0–0.9)
L MONOCYTOG DNA CSF QL NAA+NON-PROBE: NOT DETECTED
LACTATE BLD-SCNC: 1.3 MMOL/L (ref 0.5–2)
LYMPHOCYTES # BLD AUTO: 0.4 K/UL (ref 1–4.8)
LYMPHOCYTES # BLD AUTO: 0.83 K/UL (ref 1–4.8)
LYMPHOCYTES # BLD AUTO: 1.17 K/UL (ref 1–4.8)
LYMPHOCYTES NFR BLD: 11.5 % (ref 22–41)
LYMPHOCYTES NFR BLD: 14.2 % (ref 22–41)
LYMPHOCYTES NFR BLD: 4.8 % (ref 22–41)
LYMPHOCYTES NFR CSF: 79 %
MAGNESIUM SERPL-MCNC: 2.1 MG/DL (ref 1.5–2.5)
MAGNESIUM SERPL-MCNC: 2.2 MG/DL (ref 1.5–2.5)
MCH RBC QN AUTO: 29.8 PG (ref 27–33)
MCH RBC QN AUTO: 32.3 PG (ref 27–33)
MCH RBC QN AUTO: 32.3 PG (ref 27–33)
MCHC RBC AUTO-ENTMCNC: 33.5 G/DL (ref 33.6–35)
MCHC RBC AUTO-ENTMCNC: 34.2 G/DL (ref 33.6–35)
MCHC RBC AUTO-ENTMCNC: 34.3 G/DL (ref 33.6–35)
MCV RBC AUTO: 89.1 FL (ref 81.4–97.8)
MCV RBC AUTO: 94.1 FL (ref 81.4–97.8)
MCV RBC AUTO: 94.5 FL (ref 81.4–97.8)
MODE IMODE: ABNORMAL
MODE IMODE: ABNORMAL
MONOCYTES # BLD AUTO: 0.41 K/UL (ref 0–0.85)
MONOCYTES # BLD AUTO: 0.48 K/UL (ref 0–0.85)
MONOCYTES # BLD AUTO: 0.51 K/UL (ref 0–0.85)
MONOCYTES NFR BLD AUTO: 5 % (ref 0–13.4)
MONOCYTES NFR BLD AUTO: 5.8 % (ref 0–13.4)
MONOCYTES NFR BLD AUTO: 7.1 % (ref 0–13.4)
MONONUC CELLS NFR CSF: 19 %
N MEN DNA CSF QL NAA+NON-PROBE: NOT DETECTED
NEUTROPHILS # BLD AUTO: 5.82 K/UL (ref 2–7.15)
NEUTROPHILS # BLD AUTO: 6.05 K/UL (ref 2–7.15)
NEUTROPHILS # BLD AUTO: 7.32 K/UL (ref 2–7.15)
NEUTROPHILS NFR BLD: 73.5 % (ref 44–72)
NEUTROPHILS NFR BLD: 80.5 % (ref 44–72)
NEUTROPHILS NFR BLD: 88.6 % (ref 44–72)
NRBC # BLD AUTO: 0 K/UL
NRBC BLD-RTO: 0 /100 WBC
O2/TOTAL GAS SETTING VFR VENT: 30 %
O2/TOTAL GAS SETTING VFR VENT: 30 %
OTHER CELLS CSF: 2 %
PARECHOVIRUS A RNA CSF QL NAA+NON-PROBE: NOT DETECTED
PCO2 BLDA: 23.5 MMHG (ref 26–37)
PCO2 BLDA: 24.3 MMHG (ref 26–37)
PCO2 TEMP ADJ BLDA: 24.2 MMHG (ref 26–37)
PCO2 TEMP ADJ BLDA: 25.4 MMHG (ref 26–37)
PEEP END EXPIRATORY PRESSURE IPEEP: 8 CMH20
PEEP END EXPIRATORY PRESSURE IPEEP: 8 CMH20
PERCENT MINUTE VOLUME IPMV: 120
PERCENT MINUTE VOLUME IPMV: 140
PH BLDA: 7.49 [PH] (ref 7.4–7.5)
PH BLDA: 7.54 [PH] (ref 7.4–7.5)
PH TEMP ADJ BLDA: 7.48 [PH] (ref 7.4–7.5)
PH TEMP ADJ BLDA: 7.53 [PH] (ref 7.4–7.5)
PHOSPHATE SERPL-MCNC: 2.1 MG/DL (ref 2.5–4.5)
PHOSPHATE SERPL-MCNC: 2.2 MG/DL (ref 2.5–4.5)
PLATELET # BLD AUTO: 131 K/UL (ref 164–446)
PLATELET # BLD AUTO: 147 K/UL (ref 164–446)
PLATELET # BLD AUTO: 214 K/UL (ref 164–446)
PMV BLD AUTO: 10.3 FL (ref 9–12.9)
PMV BLD AUTO: 10.5 FL (ref 9–12.9)
PMV BLD AUTO: 9.9 FL (ref 9–12.9)
PO2 BLDA: 101 MMHG (ref 64–87)
PO2 BLDA: 66 MMHG (ref 64–87)
PO2 TEMP ADJ BLDA: 107 MMHG (ref 64–87)
PO2 TEMP ADJ BLDA: 69 MMHG (ref 64–87)
POTASSIUM SERPL-SCNC: 3.2 MMOL/L (ref 3.6–5.5)
POTASSIUM SERPL-SCNC: 3.5 MMOL/L (ref 3.6–5.5)
POTASSIUM SERPL-SCNC: 3.7 MMOL/L (ref 3.6–5.5)
POTASSIUM SERPL-SCNC: 4 MMOL/L (ref 3.6–5.5)
PROT CSF-MCNC: 55 MG/DL (ref 15–45)
PROT SERPL-MCNC: 5.8 G/DL (ref 6–8.2)
PROT SERPL-MCNC: 7.1 G/DL (ref 6–8.2)
RBC # BLD AUTO: 2.91 M/UL (ref 4.2–5.4)
RBC # BLD AUTO: 3.22 M/UL (ref 4.2–5.4)
RBC # BLD AUTO: 4.96 M/UL (ref 4.2–5.4)
RBC # CSF: 3 CELLS/UL
S PNEUM DNA CSF QL NAA+NON-PROBE: NOT DETECTED
SAO2 % BLDA: 95 % (ref 93–99)
SAO2 % BLDA: 99 % (ref 93–99)
SIGNIFICANT IND 70042: NORMAL
SITE SITE: NORMAL
SODIUM SERPL-SCNC: 133 MMOL/L (ref 135–145)
SODIUM SERPL-SCNC: 138 MMOL/L (ref 135–145)
SODIUM SERPL-SCNC: 140 MMOL/L (ref 135–145)
SODIUM SERPL-SCNC: 142 MMOL/L (ref 135–145)
SOURCE SOURCE: NORMAL
SPECIMEN DRAWN FROM PATIENT: ABNORMAL
SPECIMEN DRAWN FROM PATIENT: ABNORMAL
SPECIMEN VOL CSF: 15 ML
T3FREE SERPL-MCNC: 0.96 PG/ML (ref 2–4.4)
T4 FREE SERPL-MCNC: 0.2 NG/DL (ref 0.93–1.7)
TRIGL SERPL-MCNC: 235 MG/DL (ref 0–149)
TSH SERPL DL<=0.005 MIU/L-ACNC: 36.3 UIU/ML (ref 0.38–5.33)
TUBE # CSF: 3
TUBE # CSF: 4
VZV DNA CSF QL NAA+NON-PROBE: NOT DETECTED
WBC # BLD AUTO: 7.2 K/UL (ref 4.8–10.8)
WBC # BLD AUTO: 8.2 K/UL (ref 4.8–10.8)
WBC # BLD AUTO: 8.3 K/UL (ref 4.8–10.8)
WBC # CSF: 1 CELLS/UL (ref 0–10)

## 2021-01-01 PROCEDURE — 82803 BLOOD GASES ANY COMBINATION: CPT

## 2021-01-01 PROCEDURE — 700111 HCHG RX REV CODE 636 W/ 250 OVERRIDE (IP): Performed by: INTERNAL MEDICINE

## 2021-01-01 PROCEDURE — 94003 VENT MGMT INPAT SUBQ DAY: CPT

## 2021-01-01 PROCEDURE — 70553 MRI BRAIN STEM W/O & W/DYE: CPT

## 2021-01-01 PROCEDURE — 36415 COLL VENOUS BLD VENIPUNCTURE: CPT

## 2021-01-01 PROCEDURE — 84481 FREE ASSAY (FT-3): CPT

## 2021-01-01 PROCEDURE — 009U3ZX DRAINAGE OF SPINAL CANAL, PERCUTANEOUS APPROACH, DIAGNOSTIC: ICD-10-PCS | Performed by: HOSPITALIST

## 2021-01-01 PROCEDURE — A9270 NON-COVERED ITEM OR SERVICE: HCPCS | Performed by: INTERNAL MEDICINE

## 2021-01-01 PROCEDURE — 80048 BASIC METABOLIC PNL TOTAL CA: CPT

## 2021-01-01 PROCEDURE — 71045 X-RAY EXAM CHEST 1 VIEW: CPT

## 2021-01-01 PROCEDURE — 85025 COMPLETE CBC W/AUTO DIFF WBC: CPT

## 2021-01-01 PROCEDURE — 82962 GLUCOSE BLOOD TEST: CPT | Mod: 91

## 2021-01-01 PROCEDURE — 94640 AIRWAY INHALATION TREATMENT: CPT

## 2021-01-01 PROCEDURE — 62270 DX LMBR SPI PNXR: CPT | Performed by: HOSPITALIST

## 2021-01-01 PROCEDURE — 94799 UNLISTED PULMONARY SVC/PX: CPT

## 2021-01-01 PROCEDURE — 700111 HCHG RX REV CODE 636 W/ 250 OVERRIDE (IP): Performed by: STUDENT IN AN ORGANIZED HEALTH CARE EDUCATION/TRAINING PROGRAM

## 2021-01-01 PROCEDURE — 700102 HCHG RX REV CODE 250 W/ 637 OVERRIDE(OP): Performed by: INTERNAL MEDICINE

## 2021-01-01 PROCEDURE — 99291 CRITICAL CARE FIRST HOUR: CPT | Performed by: INTERNAL MEDICINE

## 2021-01-01 PROCEDURE — 87483 CNS DNA AMP PROBE TYPE 12-25: CPT

## 2021-01-01 PROCEDURE — 36600 WITHDRAWAL OF ARTERIAL BLOOD: CPT

## 2021-01-01 PROCEDURE — 4A10X4Z MONITORING OF CENTRAL NERVOUS ELECTRICAL ACTIVITY, EXTERNAL APPROACH: ICD-10-PCS | Performed by: PSYCHIATRY & NEUROLOGY

## 2021-01-01 PROCEDURE — 700105 HCHG RX REV CODE 258: Performed by: INTERNAL MEDICINE

## 2021-01-01 PROCEDURE — 95819 EEG AWAKE AND ASLEEP: CPT | Mod: 26 | Performed by: PSYCHIATRY & NEUROLOGY

## 2021-01-01 PROCEDURE — 99255 IP/OBS CONSLTJ NEW/EST HI 80: CPT | Performed by: PSYCHIATRY & NEUROLOGY

## 2021-01-01 PROCEDURE — 770022 HCHG ROOM/CARE - ICU (200)

## 2021-01-01 PROCEDURE — 84100 ASSAY OF PHOSPHORUS: CPT

## 2021-01-01 PROCEDURE — 700101 HCHG RX REV CODE 250: Performed by: STUDENT IN AN ORGANIZED HEALTH CARE EDUCATION/TRAINING PROGRAM

## 2021-01-01 PROCEDURE — 5A1945Z RESPIRATORY VENTILATION, 24-96 CONSECUTIVE HOURS: ICD-10-PCS | Performed by: INTERNAL MEDICINE

## 2021-01-01 PROCEDURE — 89051 BODY FLUID CELL COUNT: CPT

## 2021-01-01 PROCEDURE — A9270 NON-COVERED ITEM OR SERVICE: HCPCS | Performed by: STUDENT IN AN ORGANIZED HEALTH CARE EDUCATION/TRAINING PROGRAM

## 2021-01-01 PROCEDURE — 700105 HCHG RX REV CODE 258: Performed by: STUDENT IN AN ORGANIZED HEALTH CARE EDUCATION/TRAINING PROGRAM

## 2021-01-01 PROCEDURE — 99223 1ST HOSP IP/OBS HIGH 75: CPT | Mod: 25 | Performed by: INTERNAL MEDICINE

## 2021-01-01 PROCEDURE — 82962 GLUCOSE BLOOD TEST: CPT

## 2021-01-01 PROCEDURE — 84157 ASSAY OF PROTEIN OTHER: CPT

## 2021-01-01 PROCEDURE — 80053 COMPREHEN METABOLIC PANEL: CPT

## 2021-01-01 PROCEDURE — 62270 DX LMBR SPI PNXR: CPT

## 2021-01-01 PROCEDURE — 62270 DX LMBR SPI PNXR: CPT | Performed by: INTERNAL MEDICINE

## 2021-01-01 PROCEDURE — 700102 HCHG RX REV CODE 250 W/ 637 OVERRIDE(OP): Performed by: STUDENT IN AN ORGANIZED HEALTH CARE EDUCATION/TRAINING PROGRAM

## 2021-01-01 PROCEDURE — 700101 HCHG RX REV CODE 250: Performed by: INTERNAL MEDICINE

## 2021-01-01 PROCEDURE — 94150 VITAL CAPACITY TEST: CPT

## 2021-01-01 PROCEDURE — 83735 ASSAY OF MAGNESIUM: CPT

## 2021-01-01 PROCEDURE — 87070 CULTURE OTHR SPECIMN AEROBIC: CPT

## 2021-01-01 PROCEDURE — 94002 VENT MGMT INPAT INIT DAY: CPT

## 2021-01-01 PROCEDURE — 82533 TOTAL CORTISOL: CPT

## 2021-01-01 PROCEDURE — 700117 HCHG RX CONTRAST REV CODE 255: Performed by: SPECIALIST

## 2021-01-01 PROCEDURE — 83605 ASSAY OF LACTIC ACID: CPT

## 2021-01-01 PROCEDURE — 84478 ASSAY OF TRIGLYCERIDES: CPT

## 2021-01-01 PROCEDURE — 82550 ASSAY OF CK (CPK): CPT

## 2021-01-01 PROCEDURE — 95819 EEG AWAKE AND ASLEEP: CPT | Performed by: PSYCHIATRY & NEUROLOGY

## 2021-01-01 PROCEDURE — 99292 CRITICAL CARE ADDL 30 MIN: CPT | Performed by: INTERNAL MEDICINE

## 2021-01-01 PROCEDURE — 76705 ECHO EXAM OF ABDOMEN: CPT

## 2021-01-01 PROCEDURE — 95816 EEG AWAKE AND DROWSY: CPT | Performed by: PSYCHIATRY & NEUROLOGY

## 2021-01-01 PROCEDURE — 87205 SMEAR GRAM STAIN: CPT

## 2021-01-01 PROCEDURE — 84439 ASSAY OF FREE THYROXINE: CPT

## 2021-01-01 PROCEDURE — 84443 ASSAY THYROID STIM HORMONE: CPT

## 2021-01-01 PROCEDURE — 82945 GLUCOSE OTHER FLUID: CPT

## 2021-01-01 PROCEDURE — A9576 INJ PROHANCE MULTIPACK: HCPCS | Performed by: SPECIALIST

## 2021-01-01 RX ORDER — ACETAMINOPHEN 325 MG/1
650 TABLET ORAL EVERY 6 HOURS PRN
Status: DISCONTINUED | OUTPATIENT
Start: 2021-01-01 | End: 2021-01-01

## 2021-01-01 RX ORDER — ONDANSETRON 4 MG/1
4 TABLET, ORALLY DISINTEGRATING ORAL EVERY 4 HOURS PRN
Status: DISCONTINUED | OUTPATIENT
Start: 2021-01-01 | End: 2021-01-01

## 2021-01-01 RX ORDER — LIOTHYRONINE SODIUM 5 UG/1
2.5 TABLET ORAL 3 TIMES DAILY
Status: COMPLETED | OUTPATIENT
Start: 2021-01-01 | End: 2021-01-01

## 2021-01-01 RX ORDER — LORAZEPAM 2 MG/ML
1 CONCENTRATE ORAL
Status: DISCONTINUED | OUTPATIENT
Start: 2021-01-01 | End: 2021-01-01 | Stop reason: HOSPADM

## 2021-01-01 RX ORDER — ENALAPRILAT 1.25 MG/ML
1.25 INJECTION INTRAVENOUS EVERY 6 HOURS PRN
Status: DISCONTINUED | OUTPATIENT
Start: 2021-01-01 | End: 2021-01-01

## 2021-01-01 RX ORDER — LORAZEPAM 2 MG/ML
2-4 INJECTION INTRAMUSCULAR
Status: DISCONTINUED | OUTPATIENT
Start: 2021-01-01 | End: 2021-01-01

## 2021-01-01 RX ORDER — POTASSIUM CHLORIDE 29.8 MG/ML
40 INJECTION INTRAVENOUS ONCE
Status: COMPLETED | OUTPATIENT
Start: 2021-01-01 | End: 2021-01-01

## 2021-01-01 RX ORDER — ONDANSETRON 2 MG/ML
4 INJECTION INTRAMUSCULAR; INTRAVENOUS EVERY 4 HOURS PRN
Status: DISCONTINUED | OUTPATIENT
Start: 2021-01-01 | End: 2021-01-01

## 2021-01-01 RX ORDER — ACETAMINOPHEN 650 MG/1
650 SUPPOSITORY RECTAL EVERY 4 HOURS PRN
Status: DISCONTINUED | OUTPATIENT
Start: 2021-01-01 | End: 2021-01-01 | Stop reason: HOSPADM

## 2021-01-01 RX ORDER — LEVOTHYROXINE SODIUM 0.05 MG/1
50 TABLET ORAL
Status: DISCONTINUED | OUTPATIENT
Start: 2021-01-01 | End: 2021-01-01

## 2021-01-01 RX ORDER — MORPHINE SULFATE 10 MG/ML
5 INJECTION, SOLUTION INTRAMUSCULAR; INTRAVENOUS
Status: DISCONTINUED | OUTPATIENT
Start: 2021-01-01 | End: 2021-01-01 | Stop reason: HOSPADM

## 2021-01-01 RX ORDER — ACETAMINOPHEN 650 MG/1
650 SUPPOSITORY RECTAL EVERY 4 HOURS PRN
Status: DISCONTINUED | OUTPATIENT
Start: 2021-01-01 | End: 2021-01-01

## 2021-01-01 RX ORDER — GLYCOPYRROLATE 1 MG/1
1 TABLET ORAL 3 TIMES DAILY PRN
Status: DISCONTINUED | OUTPATIENT
Start: 2021-01-01 | End: 2021-01-01 | Stop reason: HOSPADM

## 2021-01-01 RX ORDER — POLYETHYLENE GLYCOL 3350 17 G/17G
1 POWDER, FOR SOLUTION ORAL
Status: DISCONTINUED | OUTPATIENT
Start: 2021-01-01 | End: 2021-01-01

## 2021-01-01 RX ORDER — LEVOTHYROXINE SODIUM 0.15 MG/1
150 TABLET ORAL
Status: DISCONTINUED | OUTPATIENT
Start: 2021-01-01 | End: 2021-01-01

## 2021-01-01 RX ORDER — CLONIDINE HYDROCHLORIDE 0.1 MG/1
0.1 TABLET ORAL EVERY 6 HOURS PRN
Status: DISCONTINUED | OUTPATIENT
Start: 2021-01-01 | End: 2021-01-01

## 2021-01-01 RX ORDER — BISACODYL 10 MG
10 SUPPOSITORY, RECTAL RECTAL
Status: DISCONTINUED | OUTPATIENT
Start: 2021-01-01 | End: 2021-01-01

## 2021-01-01 RX ORDER — LIOTHYRONINE SODIUM 5 UG/1
5 TABLET ORAL ONCE
Status: COMPLETED | OUTPATIENT
Start: 2021-01-01 | End: 2021-01-01

## 2021-01-01 RX ORDER — LORAZEPAM 2 MG/ML
1-2 INJECTION INTRAMUSCULAR
Status: DISCONTINUED | OUTPATIENT
Start: 2021-01-01 | End: 2021-01-01

## 2021-01-01 RX ORDER — BACLOFEN 10 MG/1
10 TABLET ORAL 4 TIMES DAILY
Status: DISCONTINUED | OUTPATIENT
Start: 2021-01-01 | End: 2021-01-01 | Stop reason: HOSPADM

## 2021-01-01 RX ORDER — PROMETHAZINE HYDROCHLORIDE 25 MG/1
12.5-25 TABLET ORAL EVERY 4 HOURS PRN
Status: DISCONTINUED | OUTPATIENT
Start: 2021-01-01 | End: 2021-01-01

## 2021-01-01 RX ORDER — ONDANSETRON 4 MG/1
8 TABLET, ORALLY DISINTEGRATING ORAL EVERY 8 HOURS PRN
Status: DISCONTINUED | OUTPATIENT
Start: 2021-01-01 | End: 2021-01-01 | Stop reason: HOSPADM

## 2021-01-01 RX ORDER — SODIUM CHLORIDE 9 MG/ML
INJECTION, SOLUTION INTRAVENOUS CONTINUOUS
Status: DISCONTINUED | OUTPATIENT
Start: 2021-01-01 | End: 2021-01-01

## 2021-01-01 RX ORDER — ACETAMINOPHEN 325 MG/1
650 TABLET ORAL EVERY 4 HOURS PRN
Status: DISCONTINUED | OUTPATIENT
Start: 2021-01-01 | End: 2021-01-01 | Stop reason: HOSPADM

## 2021-01-01 RX ORDER — POTASSIUM CHLORIDE 20 MEQ/1
40 TABLET, EXTENDED RELEASE ORAL ONCE
Status: COMPLETED | OUTPATIENT
Start: 2021-01-01 | End: 2021-01-01

## 2021-01-01 RX ORDER — PROMETHAZINE HYDROCHLORIDE 25 MG/1
12.5-25 SUPPOSITORY RECTAL EVERY 4 HOURS PRN
Status: DISCONTINUED | OUTPATIENT
Start: 2021-01-01 | End: 2021-01-01

## 2021-01-01 RX ORDER — MORPHINE SULFATE 10 MG/ML
10 INJECTION, SOLUTION INTRAMUSCULAR; INTRAVENOUS
Status: DISCONTINUED | OUTPATIENT
Start: 2021-01-01 | End: 2021-01-01 | Stop reason: HOSPADM

## 2021-01-01 RX ORDER — AMOXICILLIN 250 MG
2 CAPSULE ORAL 2 TIMES DAILY
Status: DISCONTINUED | OUTPATIENT
Start: 2021-01-01 | End: 2021-01-01

## 2021-01-01 RX ORDER — PROCHLORPERAZINE EDISYLATE 5 MG/ML
5-10 INJECTION INTRAMUSCULAR; INTRAVENOUS EVERY 4 HOURS PRN
Status: DISCONTINUED | OUTPATIENT
Start: 2021-01-01 | End: 2021-01-01

## 2021-01-01 RX ORDER — GLYCOPYRROLATE 0.2 MG/ML
0.2 INJECTION INTRAMUSCULAR; INTRAVENOUS 3 TIMES DAILY PRN
Status: DISCONTINUED | OUTPATIENT
Start: 2021-01-01 | End: 2021-01-01 | Stop reason: HOSPADM

## 2021-01-01 RX ORDER — FAMOTIDINE 20 MG/1
20 TABLET, FILM COATED ORAL EVERY 12 HOURS
Status: DISCONTINUED | OUTPATIENT
Start: 2021-01-01 | End: 2021-01-01

## 2021-01-01 RX ORDER — BACLOFEN 10 MG/1
10 TABLET ORAL 4 TIMES DAILY
Status: DISCONTINUED | OUTPATIENT
Start: 2021-01-01 | End: 2021-01-01

## 2021-01-01 RX ORDER — POLYVINYL ALCOHOL 14 MG/ML
2 SOLUTION/ DROPS OPHTHALMIC EVERY 6 HOURS PRN
Status: DISCONTINUED | OUTPATIENT
Start: 2021-01-01 | End: 2021-01-01 | Stop reason: HOSPADM

## 2021-01-01 RX ORDER — LORAZEPAM 2 MG/ML
1-4 INJECTION INTRAMUSCULAR
Status: DISCONTINUED | OUTPATIENT
Start: 2021-01-01 | End: 2021-01-01 | Stop reason: HOSPADM

## 2021-01-01 RX ORDER — LEVOTHYROXINE SODIUM 0.15 MG/1
150 TABLET ORAL
Status: DISCONTINUED | OUTPATIENT
Start: 2021-01-01 | End: 2021-01-01 | Stop reason: HOSPADM

## 2021-01-01 RX ORDER — DEXTROSE MONOHYDRATE 25 G/50ML
50 INJECTION, SOLUTION INTRAVENOUS
Status: DISCONTINUED | OUTPATIENT
Start: 2021-01-01 | End: 2021-01-01

## 2021-01-01 RX ORDER — ATROPINE SULFATE 10 MG/ML
2 SOLUTION/ DROPS OPHTHALMIC EVERY 4 HOURS PRN
Status: DISCONTINUED | OUTPATIENT
Start: 2021-01-01 | End: 2021-01-01 | Stop reason: HOSPADM

## 2021-01-01 RX ORDER — ONDANSETRON 2 MG/ML
8 INJECTION INTRAMUSCULAR; INTRAVENOUS EVERY 8 HOURS PRN
Status: DISCONTINUED | OUTPATIENT
Start: 2021-01-01 | End: 2021-01-01 | Stop reason: HOSPADM

## 2021-01-01 RX ORDER — DEXAMETHASONE SODIUM PHOSPHATE 4 MG/ML
10 INJECTION, SOLUTION INTRA-ARTICULAR; INTRALESIONAL; INTRAMUSCULAR; INTRAVENOUS; SOFT TISSUE ONCE
Status: COMPLETED | OUTPATIENT
Start: 2021-01-01 | End: 2021-01-01

## 2021-01-01 RX ORDER — LABETALOL HYDROCHLORIDE 5 MG/ML
10 INJECTION, SOLUTION INTRAVENOUS EVERY 4 HOURS PRN
Status: DISCONTINUED | OUTPATIENT
Start: 2021-01-01 | End: 2021-01-01

## 2021-01-01 RX ADMIN — ENOXAPARIN SODIUM 40 MG: 40 INJECTION SUBCUTANEOUS at 05:32

## 2021-01-01 RX ADMIN — ACETAMINOPHEN 650 MG: 325 TABLET, FILM COATED ORAL at 05:32

## 2021-01-01 RX ADMIN — POTASSIUM PHOSPHATE, MONOBASIC AND POTASSIUM PHOSPHATE, DIBASIC 15 MMOL: 224; 236 INJECTION, SOLUTION, CONCENTRATE INTRAVENOUS at 09:30

## 2021-01-01 RX ADMIN — POTASSIUM CHLORIDE 40 MEQ: 1500 TABLET, EXTENDED RELEASE ORAL at 18:39

## 2021-01-01 RX ADMIN — BACLOFEN 10 MG: 10 TABLET ORAL at 21:07

## 2021-01-01 RX ADMIN — THIAMINE HYDROCHLORIDE 500 MG: 100 INJECTION, SOLUTION INTRAMUSCULAR; INTRAVENOUS at 16:22

## 2021-01-01 RX ADMIN — ENOXAPARIN SODIUM 40 MG: 40 INJECTION SUBCUTANEOUS at 16:24

## 2021-01-01 RX ADMIN — LEVOTHYROXINE SODIUM ANHYDROUS 200 MCG: 100 INJECTION, POWDER, LYOPHILIZED, FOR SOLUTION INTRAVENOUS at 18:47

## 2021-01-01 RX ADMIN — ACETAMINOPHEN 650 MG: 325 TABLET, FILM COATED ORAL at 17:16

## 2021-01-01 RX ADMIN — ENOXAPARIN SODIUM 40 MG: 40 INJECTION SUBCUTANEOUS at 05:31

## 2021-01-01 RX ADMIN — THIAMINE HYDROCHLORIDE 500 MG: 100 INJECTION, SOLUTION INTRAMUSCULAR; INTRAVENOUS at 05:32

## 2021-01-01 RX ADMIN — HYDROCORTISONE SODIUM SUCCINATE 100 MG: 100 INJECTION, POWDER, FOR SOLUTION INTRAMUSCULAR; INTRAVENOUS at 21:07

## 2021-01-01 RX ADMIN — BACLOFEN 10 MG: 10 TABLET ORAL at 08:14

## 2021-01-01 RX ADMIN — PROPOFOL 30 MCG/KG/MIN: 10 INJECTION, EMULSION INTRAVENOUS at 12:34

## 2021-01-01 RX ADMIN — SODIUM CHLORIDE: 9 INJECTION, SOLUTION INTRAVENOUS at 17:19

## 2021-01-01 RX ADMIN — PROPOFOL 30 MCG/KG/MIN: 10 INJECTION, EMULSION INTRAVENOUS at 05:32

## 2021-01-01 RX ADMIN — DOCUSATE SODIUM 50 MG AND SENNOSIDES 8.6 MG 2 TABLET: 8.6; 5 TABLET, FILM COATED ORAL at 05:32

## 2021-01-01 RX ADMIN — PROPOFOL 40 MCG/KG/MIN: 10 INJECTION, EMULSION INTRAVENOUS at 01:47

## 2021-01-01 RX ADMIN — LIOTHYRONINE SODIUM 5 MCG: 5 TABLET ORAL at 18:39

## 2021-01-01 RX ADMIN — LIOTHYRONINE SODIUM 2.5 MCG: 5 TABLET ORAL at 08:14

## 2021-01-01 RX ADMIN — BACLOFEN 10 MG: 10 TABLET ORAL at 17:15

## 2021-01-01 RX ADMIN — PROPOFOL 25 MCG/KG/MIN: 10 INJECTION, EMULSION INTRAVENOUS at 14:36

## 2021-01-01 RX ADMIN — DOCUSATE SODIUM 50 MG AND SENNOSIDES 8.6 MG 2 TABLET: 8.6; 5 TABLET, FILM COATED ORAL at 05:31

## 2021-01-01 RX ADMIN — LORAZEPAM 2 MG: 2 INJECTION INTRAMUSCULAR; INTRAVENOUS at 10:38

## 2021-01-01 RX ADMIN — POTASSIUM CHLORIDE 40 MEQ: 29.8 INJECTION, SOLUTION INTRAVENOUS at 22:37

## 2021-01-01 RX ADMIN — ALBUTEROL SULFATE 2.5 MG: 2.5 SOLUTION RESPIRATORY (INHALATION) at 21:38

## 2021-01-01 RX ADMIN — LIOTHYRONINE SODIUM 2.5 MCG: 5 TABLET ORAL at 15:26

## 2021-01-01 RX ADMIN — DOCUSATE SODIUM 50 MG AND SENNOSIDES 8.6 MG 2 TABLET: 8.6; 5 TABLET, FILM COATED ORAL at 17:06

## 2021-01-01 RX ADMIN — HYDROCORTISONE SODIUM SUCCINATE 100 MG: 100 INJECTION, POWDER, FOR SOLUTION INTRAMUSCULAR; INTRAVENOUS at 05:31

## 2021-01-01 RX ADMIN — BACLOFEN 10 MG: 10 TABLET ORAL at 17:06

## 2021-01-01 RX ADMIN — PROPOFOL 20 MCG/KG/MIN: 10 INJECTION, EMULSION INTRAVENOUS at 20:08

## 2021-01-01 RX ADMIN — LEVOTHYROXINE SODIUM 150 MCG: 0.15 TABLET ORAL at 05:32

## 2021-01-01 RX ADMIN — THIAMINE HYDROCHLORIDE 500 MG: 100 INJECTION, SOLUTION INTRAMUSCULAR; INTRAVENOUS at 05:33

## 2021-01-01 RX ADMIN — FAMOTIDINE 20 MG: 20 TABLET ORAL at 17:15

## 2021-01-01 RX ADMIN — RACEPINEPHRINE HYDROCHLORIDE 0.5 ML: 11.25 SOLUTION RESPIRATORY (INHALATION) at 08:46

## 2021-01-01 RX ADMIN — BACLOFEN 10 MG: 10 TABLET ORAL at 09:09

## 2021-01-01 RX ADMIN — FAMOTIDINE 20 MG: 10 INJECTION INTRAVENOUS at 05:32

## 2021-01-01 RX ADMIN — RACEPINEPHRINE HYDROCHLORIDE 0.5 ML: 11.25 SOLUTION RESPIRATORY (INHALATION) at 08:48

## 2021-01-01 RX ADMIN — MORPHINE SULFATE 10 MG: 10 INJECTION INTRAVENOUS at 10:38

## 2021-01-01 RX ADMIN — ALBUTEROL SULFATE 2.5 MG: 2.5 SOLUTION RESPIRATORY (INHALATION) at 18:32

## 2021-01-01 RX ADMIN — PROPOFOL 40 MCG/KG/MIN: 10 INJECTION, EMULSION INTRAVENOUS at 18:47

## 2021-01-01 RX ADMIN — FENTANYL CITRATE 100 MCG: 50 INJECTION, SOLUTION INTRAMUSCULAR; INTRAVENOUS at 15:01

## 2021-01-01 RX ADMIN — BACLOFEN 10 MG: 10 TABLET ORAL at 21:13

## 2021-01-01 RX ADMIN — GADOTERIDOL 15 ML: 279.3 INJECTION, SOLUTION INTRAVENOUS at 01:02

## 2021-01-01 RX ADMIN — FAMOTIDINE 20 MG: 10 INJECTION INTRAVENOUS at 17:06

## 2021-01-01 RX ADMIN — FAMOTIDINE 20 MG: 10 INJECTION INTRAVENOUS at 05:31

## 2021-01-01 RX ADMIN — LEVOTHYROXINE SODIUM ANHYDROUS 50 MCG: 100 INJECTION, POWDER, LYOPHILIZED, FOR SOLUTION INTRAVENOUS at 08:14

## 2021-01-01 RX ADMIN — PROPOFOL 25 MCG/KG/MIN: 10 INJECTION, EMULSION INTRAVENOUS at 18:04

## 2021-01-01 RX ADMIN — ALBUTEROL SULFATE 2.5 MG: 2.5 SOLUTION RESPIRATORY (INHALATION) at 08:58

## 2021-01-01 RX ADMIN — HYDROCORTISONE SODIUM SUCCINATE 100 MG: 100 INJECTION, POWDER, FOR SOLUTION INTRAMUSCULAR; INTRAVENOUS at 15:26

## 2021-01-01 RX ADMIN — SODIUM CHLORIDE: 9 INJECTION, SOLUTION INTRAVENOUS at 17:16

## 2021-01-01 RX ADMIN — PROPOFOL 25 MCG/KG/MIN: 10 INJECTION, EMULSION INTRAVENOUS at 02:09

## 2021-01-01 RX ADMIN — POTASSIUM PHOSPHATE, MONOBASIC AND POTASSIUM PHOSPHATE, DIBASIC 30 MMOL: 224; 236 INJECTION, SOLUTION, CONCENTRATE INTRAVENOUS at 09:17

## 2021-01-01 RX ADMIN — SODIUM CHLORIDE: 9 INJECTION, SOLUTION INTRAVENOUS at 05:32

## 2021-01-01 RX ADMIN — DEXAMETHASONE SODIUM PHOSPHATE 10 MG: 4 INJECTION, SOLUTION INTRA-ARTICULAR; INTRALESIONAL; INTRAMUSCULAR; INTRAVENOUS; SOFT TISSUE at 09:09

## 2021-01-01 RX ADMIN — MORPHINE SULFATE 10 MG: 10 INJECTION INTRAVENOUS at 11:25

## 2021-01-01 RX ADMIN — ACETAMINOPHEN 650 MG: 325 TABLET, FILM COATED ORAL at 18:43

## 2021-01-01 RX ADMIN — LIOTHYRONINE SODIUM 2.5 MCG: 5 TABLET ORAL at 21:13

## 2021-01-01 RX ADMIN — BACLOFEN 10 MG: 10 TABLET ORAL at 12:41

## 2021-01-01 ASSESSMENT — COGNITIVE AND FUNCTIONAL STATUS - GENERAL
TOILETING: TOTAL
DRESSING REGULAR LOWER BODY CLOTHING: TOTAL
STANDING UP FROM CHAIR USING ARMS: TOTAL
PERSONAL GROOMING: TOTAL
EATING MEALS: TOTAL
WALKING IN HOSPITAL ROOM: TOTAL
CLIMB 3 TO 5 STEPS WITH RAILING: TOTAL
MOVING FROM LYING ON BACK TO SITTING ON SIDE OF FLAT BED: UNABLE
DRESSING REGULAR UPPER BODY CLOTHING: TOTAL
DAILY ACTIVITIY SCORE: 6
TURNING FROM BACK TO SIDE WHILE IN FLAT BAD: UNABLE
MOVING TO AND FROM BED TO CHAIR: UNABLE
HELP NEEDED FOR BATHING: TOTAL
SUGGESTED CMS G CODE MODIFIER DAILY ACTIVITY: CN
MOBILITY SCORE: 6
SUGGESTED CMS G CODE MODIFIER MOBILITY: CN

## 2021-01-01 ASSESSMENT — LIFESTYLE VARIABLES
ON A TYPICAL DAY WHEN YOU DRINK ALCOHOL HOW MANY DRINKS DO YOU HAVE: 0
ALCOHOL_USE: NO
TOTAL SCORE: 0
TOTAL SCORE: 0
HOW MANY TIMES IN THE PAST YEAR HAVE YOU HAD 5 OR MORE DRINKS IN A DAY: 0
HAVE PEOPLE ANNOYED YOU BY CRITICIZING YOUR DRINKING: NO
HAVE YOU EVER FELT YOU SHOULD CUT DOWN ON YOUR DRINKING: NO
EVER HAD A DRINK FIRST THING IN THE MORNING TO STEADY YOUR NERVES TO GET RID OF A HANGOVER: NO
CONSUMPTION TOTAL: NEGATIVE
DOES PATIENT WANT TO STOP DRINKING: NO
AVERAGE NUMBER OF DAYS PER WEEK YOU HAVE A DRINK CONTAINING ALCOHOL: 0
TOTAL SCORE: 0
EVER FELT BAD OR GUILTY ABOUT YOUR DRINKING: NO

## 2021-01-01 ASSESSMENT — ENCOUNTER SYMPTOMS
FALLS: 0
FEVER: 0
BACK PAIN: 1
NERVOUS/ANXIOUS: 0
CHILLS: 0
EYE DISCHARGE: 0
WEAKNESS: 1
BRUISES/BLEEDS EASILY: 0
VOMITING: 0
HEADACHES: 0
DEPRESSION: 1
FLANK PAIN: 0
EYE REDNESS: 0
NECK PAIN: 1
SORE THROAT: 0
NAUSEA: 0
SHORTNESS OF BREATH: 0
COUGH: 0
PALPITATIONS: 0
ABDOMINAL PAIN: 0

## 2021-01-01 ASSESSMENT — PAIN DESCRIPTION - PAIN TYPE
TYPE: ACUTE PAIN

## 2021-01-01 ASSESSMENT — FIBROSIS 4 INDEX
FIB4 SCORE: 1.66
FIB4 SCORE: 1.46
FIB4 SCORE: 2.39

## 2021-01-01 ASSESSMENT — PULMONARY FUNCTION TESTS: FVC: 1.5

## 2021-05-18 ENCOUNTER — HOSPITAL ENCOUNTER (INPATIENT)
Dept: HOSPITAL 8 - ED | Age: 61
LOS: 2 days | Discharge: ANOTHER HOSPITAL | DRG: 208 | End: 2021-05-20
Attending: HOSPITALIST | Admitting: EMERGENCY MEDICINE
Payer: COMMERCIAL

## 2021-05-18 VITALS — WEIGHT: 193.12 LBS | BODY MASS INDEX: 32.97 KG/M2 | HEIGHT: 64 IN

## 2021-05-18 DIAGNOSIS — D72.829: ICD-10-CM

## 2021-05-18 DIAGNOSIS — J96.01: Primary | ICD-10-CM

## 2021-05-18 DIAGNOSIS — G93.40: ICD-10-CM

## 2021-05-18 DIAGNOSIS — G35: ICD-10-CM

## 2021-05-18 DIAGNOSIS — Z90.13: ICD-10-CM

## 2021-05-18 DIAGNOSIS — Z99.11: ICD-10-CM

## 2021-05-18 DIAGNOSIS — T38.0X5A: ICD-10-CM

## 2021-05-18 DIAGNOSIS — Z85.828: ICD-10-CM

## 2021-05-18 DIAGNOSIS — N17.9: ICD-10-CM

## 2021-05-18 DIAGNOSIS — Z85.3: ICD-10-CM
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
## 2021-05-18 DIAGNOSIS — E87.1: ICD-10-CM

## 2021-05-18 DIAGNOSIS — E03.5: ICD-10-CM

## 2021-05-18 DIAGNOSIS — F32.9: ICD-10-CM

## 2021-05-18 LAB
ALBUMIN SERPL-MCNC: 4.2 G/DL (ref 3.4–5)
ALP SERPL-CCNC: 45 U/L (ref 45–117)
ALT SERPL-CCNC: 97 U/L (ref 12–78)
ANION GAP SERPL CALC-SCNC: 6 MMOL/L (ref 5–15)
APTT BLD: 31 SECONDS (ref 25–31)
BASOPHILS # BLD AUTO: 0 X10^3/UL (ref 0–0.1)
BASOPHILS NFR BLD AUTO: 1 % (ref 0–1)
BILIRUB SERPL-MCNC: 0.8 MG/DL (ref 0.2–1)
CALCIUM SERPL-MCNC: 9.2 MG/DL (ref 8.5–10.1)
CHLORIDE SERPL-SCNC: 105 MMOL/L (ref 98–107)
CK SERPL-CCNC: 208 U/L (ref 26–192)
CREAT SERPL-MCNC: 1.12 MG/DL (ref 0.55–1.02)
EOSINOPHIL # BLD AUTO: 0.2 X10^3/UL (ref 0–0.4)
EOSINOPHIL NFR BLD AUTO: 4 % (ref 1–7)
ERYTHROCYTE [DISTWIDTH] IN BLOOD BY AUTOMATED COUNT: 14.9 % (ref 9.6–15.2)
INR PPP: 0.98 (ref 0.93–1.1)
LYMPHOCYTES # BLD AUTO: 0.8 X10^3/UL (ref 1–3.4)
LYMPHOCYTES NFR BLD AUTO: 12 % (ref 22–44)
MCH RBC QN AUTO: 31.7 PG (ref 27–34.8)
MCHC RBC AUTO-ENTMCNC: 34.9 G/DL (ref 32.4–35.8)
MD: NO
MICROSCOPIC: (no result)
MONOCYTES # BLD AUTO: 0.2 X10^3/UL (ref 0.2–0.8)
MONOCYTES NFR BLD AUTO: 4 % (ref 2–9)
NEUTROPHILS # BLD AUTO: 4.9 X10^3/UL (ref 1.8–6.8)
NEUTROPHILS NFR BLD AUTO: 79 % (ref 42–75)
O2/TOTAL GAS SETTING VFR VENT: 100 %
PLATELET # BLD AUTO: 197 X10^3/UL (ref 130–400)
PMV BLD AUTO: 7.5 FL (ref 7.4–10.4)
PROT SERPL-MCNC: 7.2 G/DL (ref 6.4–8.2)
PROTHROMBIN TIME: 10.5 SECONDS (ref 9.6–11.5)
RBC # BLD AUTO: 4.31 X10^6/UL (ref 3.82–5.3)
T4 FREE SERPL-MCNC: 0.17 NG/DL (ref 0.76–1.46)
TROPONIN I SERPL-MCNC: < 0.015 NG/ML (ref 0–0.04)
VANCOMYCIN TROUGH SERPL-MCNC: < 1.7 MG/DL (ref 2.8–20)

## 2021-05-18 PROCEDURE — 84100 ASSAY OF PHOSPHORUS: CPT

## 2021-05-18 PROCEDURE — 87040 BLOOD CULTURE FOR BACTERIA: CPT

## 2021-05-18 PROCEDURE — 80048 BASIC METABOLIC PNL TOTAL CA: CPT

## 2021-05-18 PROCEDURE — C1751 CATH, INF, PER/CENT/MIDLINE: HCPCS

## 2021-05-18 PROCEDURE — 0BH17EZ INSERTION OF ENDOTRACHEAL AIRWAY INTO TRACHEA, VIA NATURAL OR ARTIFICIAL OPENING: ICD-10-PCS | Performed by: HOSPITALIST

## 2021-05-18 PROCEDURE — 94002 VENT MGMT INPAT INIT DAY: CPT

## 2021-05-18 PROCEDURE — 83735 ASSAY OF MAGNESIUM: CPT

## 2021-05-18 PROCEDURE — 36573 INSJ PICC RS&I 5 YR+: CPT

## 2021-05-18 PROCEDURE — 84484 ASSAY OF TROPONIN QUANT: CPT

## 2021-05-18 PROCEDURE — 94003 VENT MGMT INPAT SUBQ DAY: CPT

## 2021-05-18 PROCEDURE — 84145 PROCALCITONIN (PCT): CPT

## 2021-05-18 PROCEDURE — 71045 X-RAY EXAM CHEST 1 VIEW: CPT

## 2021-05-18 PROCEDURE — 85730 THROMBOPLASTIN TIME PARTIAL: CPT

## 2021-05-18 PROCEDURE — 84443 ASSAY THYROID STIM HORMONE: CPT

## 2021-05-18 PROCEDURE — 93005 ELECTROCARDIOGRAM TRACING: CPT

## 2021-05-18 PROCEDURE — 82803 BLOOD GASES ANY COMBINATION: CPT

## 2021-05-18 PROCEDURE — 84478 ASSAY OF TRIGLYCERIDES: CPT

## 2021-05-18 PROCEDURE — 87081 CULTURE SCREEN ONLY: CPT

## 2021-05-18 PROCEDURE — 80299 QUANTITATIVE ASSAY DRUG: CPT

## 2021-05-18 PROCEDURE — 83880 ASSAY OF NATRIURETIC PEPTIDE: CPT

## 2021-05-18 PROCEDURE — 84439 ASSAY OF FREE THYROXINE: CPT

## 2021-05-18 PROCEDURE — 80053 COMPREHEN METABOLIC PANEL: CPT

## 2021-05-18 PROCEDURE — G0480 DRUG TEST DEF 1-7 CLASSES: HCPCS

## 2021-05-18 PROCEDURE — 0T9B70Z DRAINAGE OF BLADDER WITH DRAINAGE DEVICE, VIA NATURAL OR ARTIFICIAL OPENING: ICD-10-PCS | Performed by: HOSPITALIST

## 2021-05-18 PROCEDURE — 87205 SMEAR GRAM STAIN: CPT

## 2021-05-18 PROCEDURE — 36415 COLL VENOUS BLD VENIPUNCTURE: CPT

## 2021-05-18 PROCEDURE — 85610 PROTHROMBIN TIME: CPT

## 2021-05-18 PROCEDURE — 70450 CT HEAD/BRAIN W/O DYE: CPT

## 2021-05-18 PROCEDURE — 85025 COMPLETE CBC W/AUTO DIFF WBC: CPT

## 2021-05-18 PROCEDURE — 31500 INSERT EMERGENCY AIRWAY: CPT

## 2021-05-18 PROCEDURE — 82533 TOTAL CORTISOL: CPT

## 2021-05-18 PROCEDURE — 95816 EEG AWAKE AND DROWSY: CPT

## 2021-05-18 PROCEDURE — 82550 ASSAY OF CK (CPK): CPT

## 2021-05-18 PROCEDURE — 80329 ANALGESICS NON-OPIOID 1 OR 2: CPT

## 2021-05-18 PROCEDURE — 82140 ASSAY OF AMMONIA: CPT

## 2021-05-18 PROCEDURE — 5A1945Z RESPIRATORY VENTILATION, 24-96 CONSECUTIVE HOURS: ICD-10-PCS | Performed by: HOSPITALIST

## 2021-05-18 PROCEDURE — 81003 URINALYSIS AUTO W/O SCOPE: CPT

## 2021-05-18 PROCEDURE — 82962 GLUCOSE BLOOD TEST: CPT

## 2021-05-18 PROCEDURE — 36600 WITHDRAWAL OF ARTERIAL BLOOD: CPT

## 2021-05-18 PROCEDURE — 84481 FREE ASSAY (FT-3): CPT

## 2021-05-18 PROCEDURE — 83605 ASSAY OF LACTIC ACID: CPT

## 2021-05-18 PROCEDURE — 96374 THER/PROPH/DIAG INJ IV PUSH: CPT

## 2021-05-18 PROCEDURE — 80320 DRUG SCREEN QUANTALCOHOLS: CPT

## 2021-05-18 PROCEDURE — 80307 DRUG TEST PRSMV CHEM ANLYZR: CPT

## 2021-05-18 PROCEDURE — 87070 CULTURE OTHR SPECIMN AEROBIC: CPT

## 2021-05-18 RX ADMIN — INSULIN LISPRO SCH UNITS: 100 INJECTION, SOLUTION INTRAVENOUS; SUBCUTANEOUS at 16:00

## 2021-05-18 RX ADMIN — NOREPINEPHRINE BITARTRATE PRN MLS/HR: 1 INJECTION INTRAVENOUS at 20:35

## 2021-05-18 RX ADMIN — LEVOTHYROXINE SODIUM ANHYDROUS SCH MCG: 100 INJECTION, POWDER, LYOPHILIZED, FOR SOLUTION INTRAVENOUS at 13:59

## 2021-05-18 RX ADMIN — FAMOTIDINE SCH MG: 10 INJECTION INTRAVENOUS at 23:05

## 2021-05-18 RX ADMIN — BACLOFEN SCH MG: 10 TABLET ORAL at 16:39

## 2021-05-18 RX ADMIN — HEPARIN SODIUM SCH UNITS: 5000 INJECTION, SOLUTION INTRAVENOUS; SUBCUTANEOUS at 12:21

## 2021-05-18 RX ADMIN — BUDESONIDE AND FORMOTEROL FUMARATE DIHYDRATE SCH MG: 160; 4.5 AEROSOL RESPIRATORY (INHALATION) at 20:51

## 2021-05-18 RX ADMIN — INSULIN LISPRO SCH UNITS: 100 INJECTION, SOLUTION INTRAVENOUS; SUBCUTANEOUS at 20:39

## 2021-05-18 RX ADMIN — PROPOFOL PRN MLS/HR: 10 INJECTION, EMULSION INTRAVENOUS at 18:49

## 2021-05-18 RX ADMIN — FAMOTIDINE SCH MG: 10 INJECTION INTRAVENOUS at 12:21

## 2021-05-18 RX ADMIN — BACLOFEN SCH MG: 10 TABLET ORAL at 20:43

## 2021-05-18 RX ADMIN — HEPARIN SODIUM SCH UNITS: 5000 INJECTION, SOLUTION INTRAVENOUS; SUBCUTANEOUS at 20:43

## 2021-05-18 NOTE — NUR
PT RESTING CALMLY, NG TUBE PATENT TO LCS. RODRIGUEZ DRAINING TO GRAVITY. RT AT BS. 
WARMING MEASURES REMAIN IN PLACE. ERP DISCUSSED POC WITH  AT BS.

## 2021-05-18 NOTE — NUR
INTUBATED AT 0845, PLACEMENT AT 24 @ TEETH. NG TUBED ALSO PLACED, AUSCULATED 
FOR PLACEMENT AND BILE NOTED IN TUBE, AWAITING CHEST X-RAY CONFIRMATION. PT TO 
CT

## 2021-05-18 NOTE — NUR
PT BIBA FROM HOME, FOUND UNRESPONSIVE BY  AT APPROX 0700 THIS MORNING. 
LAST KNOWN WELL AT 0130 THIS MORNING. HX OF MS. GCS 3 ON ARRIVAL PER [facility] WITH 
IMPROVEMENT TO 6, GAG REFLEX ABSENT, , PIV IN PLACE, NO MEDS GIVEN PTA, 
ARRIVED WITH NRB & REMAINS UNRESPONSIVE TO STAFF BUT WITHDRAWS TO PAIN.

## 2021-05-19 VITALS — DIASTOLIC BLOOD PRESSURE: 56 MMHG | SYSTOLIC BLOOD PRESSURE: 94 MMHG

## 2021-05-19 VITALS — SYSTOLIC BLOOD PRESSURE: 91 MMHG | DIASTOLIC BLOOD PRESSURE: 54 MMHG

## 2021-05-19 VITALS — DIASTOLIC BLOOD PRESSURE: 61 MMHG | SYSTOLIC BLOOD PRESSURE: 96 MMHG

## 2021-05-19 VITALS — DIASTOLIC BLOOD PRESSURE: 63 MMHG | SYSTOLIC BLOOD PRESSURE: 98 MMHG

## 2021-05-19 VITALS — DIASTOLIC BLOOD PRESSURE: 105 MMHG | SYSTOLIC BLOOD PRESSURE: 149 MMHG

## 2021-05-19 VITALS — DIASTOLIC BLOOD PRESSURE: 54 MMHG | SYSTOLIC BLOOD PRESSURE: 90 MMHG

## 2021-05-19 VITALS — DIASTOLIC BLOOD PRESSURE: 76 MMHG | SYSTOLIC BLOOD PRESSURE: 128 MMHG

## 2021-05-19 VITALS — DIASTOLIC BLOOD PRESSURE: 52 MMHG | SYSTOLIC BLOOD PRESSURE: 92 MMHG

## 2021-05-19 VITALS — DIASTOLIC BLOOD PRESSURE: 58 MMHG | SYSTOLIC BLOOD PRESSURE: 93 MMHG

## 2021-05-19 VITALS — DIASTOLIC BLOOD PRESSURE: 80 MMHG | SYSTOLIC BLOOD PRESSURE: 132 MMHG

## 2021-05-19 VITALS — DIASTOLIC BLOOD PRESSURE: 54 MMHG | SYSTOLIC BLOOD PRESSURE: 94 MMHG

## 2021-05-19 VITALS — DIASTOLIC BLOOD PRESSURE: 56 MMHG | SYSTOLIC BLOOD PRESSURE: 92 MMHG

## 2021-05-19 VITALS — SYSTOLIC BLOOD PRESSURE: 123 MMHG | DIASTOLIC BLOOD PRESSURE: 78 MMHG

## 2021-05-19 VITALS — DIASTOLIC BLOOD PRESSURE: 82 MMHG | SYSTOLIC BLOOD PRESSURE: 129 MMHG

## 2021-05-19 VITALS — DIASTOLIC BLOOD PRESSURE: 88 MMHG | SYSTOLIC BLOOD PRESSURE: 143 MMHG

## 2021-05-19 VITALS — SYSTOLIC BLOOD PRESSURE: 94 MMHG | DIASTOLIC BLOOD PRESSURE: 52 MMHG

## 2021-05-19 LAB
ALBUMIN SERPL-MCNC: 4 G/DL (ref 3.4–5)
ALP SERPL-CCNC: 50 U/L (ref 45–117)
ALT SERPL-CCNC: 83 U/L (ref 12–78)
ANION GAP SERPL CALC-SCNC: 9 MMOL/L (ref 5–15)
BASOPHILS # BLD AUTO: 0 X10^3/UL (ref 0–0.1)
BASOPHILS NFR BLD AUTO: 0 % (ref 0–1)
BILIRUB SERPL-MCNC: 0.7 MG/DL (ref 0.2–1)
CALCIUM SERPL-MCNC: 8.6 MG/DL (ref 8.5–10.1)
CHLORIDE SERPL-SCNC: 106 MMOL/L (ref 98–107)
CREAT SERPL-MCNC: 1.69 MG/DL (ref 0.55–1.02)
EOSINOPHIL # BLD AUTO: 0 X10^3/UL (ref 0–0.4)
EOSINOPHIL NFR BLD AUTO: 0 % (ref 1–7)
ERYTHROCYTE [DISTWIDTH] IN BLOOD BY AUTOMATED COUNT: 14.8 % (ref 9.6–15.2)
LYMPHOCYTES # BLD AUTO: 0.9 X10^3/UL (ref 1–3.4)
LYMPHOCYTES NFR BLD AUTO: 5 % (ref 22–44)
MCH RBC QN AUTO: 31.6 PG (ref 27–34.8)
MCHC RBC AUTO-ENTMCNC: 34.9 G/DL (ref 32.4–35.8)
MD: (no result)
MONOCYTES # BLD AUTO: 0.3 X10^3/UL (ref 0.2–0.8)
MONOCYTES NFR BLD AUTO: 2 % (ref 2–9)
NEUTROPHILS # BLD AUTO: 16.1 X10^3/UL (ref 1.8–6.8)
NEUTROPHILS NFR BLD AUTO: 93 % (ref 42–75)
O2/TOTAL GAS SETTING VFR VENT: 40 %
PLATELET # BLD AUTO: 331 X10^3/UL (ref 130–400)
PMV BLD AUTO: 7.6 FL (ref 7.4–10.4)
PROT SERPL-MCNC: 7.1 G/DL (ref 6.4–8.2)
RBC # BLD AUTO: 4.59 X10^6/UL (ref 3.82–5.3)

## 2021-05-19 PROCEDURE — 02HV33Z INSERTION OF INFUSION DEVICE INTO SUPERIOR VENA CAVA, PERCUTANEOUS APPROACH: ICD-10-PCS | Performed by: RADIOLOGY

## 2021-05-19 PROCEDURE — B548ZZA ULTRASONOGRAPHY OF SUPERIOR VENA CAVA, GUIDANCE: ICD-10-PCS | Performed by: RADIOLOGY

## 2021-05-19 RX ADMIN — HEPARIN SODIUM SCH UNITS: 5000 INJECTION, SOLUTION INTRAVENOUS; SUBCUTANEOUS at 12:22

## 2021-05-19 RX ADMIN — INSULIN LISPRO SCH UNITS: 100 INJECTION, SOLUTION INTRAVENOUS; SUBCUTANEOUS at 16:25

## 2021-05-19 RX ADMIN — HEPARIN SODIUM SCH UNITS: 5000 INJECTION, SOLUTION INTRAVENOUS; SUBCUTANEOUS at 20:24

## 2021-05-19 RX ADMIN — INSULIN LISPRO SCH UNITS: 100 INJECTION, SOLUTION INTRAVENOUS; SUBCUTANEOUS at 11:07

## 2021-05-19 RX ADMIN — INSULIN LISPRO SCH UNITS: 100 INJECTION, SOLUTION INTRAVENOUS; SUBCUTANEOUS at 20:32

## 2021-05-19 RX ADMIN — LEVOTHYROXINE SODIUM ANHYDROUS SCH MCG: 100 INJECTION, POWDER, LYOPHILIZED, FOR SOLUTION INTRAVENOUS at 09:11

## 2021-05-19 RX ADMIN — PROPOFOL PRN MLS/HR: 10 INJECTION, EMULSION INTRAVENOUS at 02:58

## 2021-05-19 RX ADMIN — INSULIN LISPRO SCH UNITS: 100 INJECTION, SOLUTION INTRAVENOUS; SUBCUTANEOUS at 08:34

## 2021-05-19 RX ADMIN — LIOTHYRONINE SODIUM SCH MCG: 5 TABLET ORAL at 05:44

## 2021-05-19 RX ADMIN — NOREPINEPHRINE BITARTRATE PRN MLS/HR: 1 INJECTION INTRAVENOUS at 18:30

## 2021-05-19 RX ADMIN — BUDESONIDE AND FORMOTEROL FUMARATE DIHYDRATE SCH MG: 160; 4.5 AEROSOL RESPIRATORY (INHALATION) at 20:27

## 2021-05-19 RX ADMIN — SODIUM CHLORIDE SCH MLS/HR: 0.9 INJECTION, SOLUTION INTRAVENOUS at 20:26

## 2021-05-19 RX ADMIN — PAROXETINE SCH MG: 10 TABLET, FILM COATED ORAL at 09:12

## 2021-05-19 RX ADMIN — BUDESONIDE AND FORMOTEROL FUMARATE DIHYDRATE SCH MG: 160; 4.5 AEROSOL RESPIRATORY (INHALATION) at 09:00

## 2021-05-19 RX ADMIN — HEPARIN SODIUM SCH UNITS: 5000 INJECTION, SOLUTION INTRAVENOUS; SUBCUTANEOUS at 04:54

## 2021-05-19 RX ADMIN — BACLOFEN SCH MG: 10 TABLET ORAL at 05:44

## 2021-05-19 RX ADMIN — SODIUM CHLORIDE SCH MLS/HR: 0.9 INJECTION, SOLUTION INTRAVENOUS at 13:31

## 2021-05-20 PROBLEM — R41.82 ALTERED MENTAL STATUS: Status: ACTIVE | Noted: 2021-01-01

## 2021-05-20 PROBLEM — E03.9 HYPOTHYROIDISM: Status: ACTIVE | Noted: 2021-01-01

## 2021-05-20 PROBLEM — J96.01 ACUTE RESPIRATORY FAILURE WITH HYPOXIA (HCC): Status: ACTIVE | Noted: 2021-01-01

## 2021-05-20 PROBLEM — R79.89 ELEVATED LFTS: Status: ACTIVE | Noted: 2021-01-01

## 2021-05-20 PROBLEM — G35 MULTIPLE SCLEROSIS (HCC): Status: ACTIVE | Noted: 2021-01-01

## 2021-05-20 LAB
ANION GAP SERPL CALC-SCNC: 4 MMOL/L (ref 5–15)
BASOPHILS # BLD AUTO: 0 X10^3/UL (ref 0–0.1)
BASOPHILS NFR BLD AUTO: 0 % (ref 0–1)
CALCIUM SERPL-MCNC: 8.2 MG/DL (ref 8.5–10.1)
CHLORIDE SERPL-SCNC: 113 MMOL/L (ref 98–107)
CREAT SERPL-MCNC: 1.18 MG/DL (ref 0.55–1.02)
EOSINOPHIL # BLD AUTO: 0 X10^3/UL (ref 0–0.4)
EOSINOPHIL NFR BLD AUTO: 0 % (ref 1–7)
ERYTHROCYTE [DISTWIDTH] IN BLOOD BY AUTOMATED COUNT: 14.4 % (ref 9.6–15.2)
LYMPHOCYTES # BLD AUTO: 0.7 X10^3/UL (ref 1–3.4)
LYMPHOCYTES NFR BLD AUTO: 8 % (ref 22–44)
MCH RBC QN AUTO: 32.9 PG (ref 27–34.8)
MCHC RBC AUTO-ENTMCNC: 36.3 G/DL (ref 32.4–35.8)
MD: NO
MONOCYTES # BLD AUTO: 0.5 X10^3/UL (ref 0.2–0.8)
MONOCYTES NFR BLD AUTO: 5 % (ref 2–9)
NEUTROPHILS # BLD AUTO: 7.6 X10^3/UL (ref 1.8–6.8)
NEUTROPHILS NFR BLD AUTO: 86 % (ref 42–75)
O2/TOTAL GAS SETTING VFR VENT: 40 %
PLATELET # BLD AUTO: 157 X10^3/UL (ref 130–400)
PMV BLD AUTO: 7.6 FL (ref 7.4–10.4)
RBC # BLD AUTO: 3.47 X10^6/UL (ref 3.82–5.3)

## 2021-05-20 RX ADMIN — HEPARIN SODIUM SCH UNITS: 5000 INJECTION, SOLUTION INTRAVENOUS; SUBCUTANEOUS at 12:37

## 2021-05-20 RX ADMIN — SODIUM CHLORIDE SCH MLS/HR: 0.9 INJECTION, SOLUTION INTRAVENOUS at 05:31

## 2021-05-20 RX ADMIN — LIOTHYRONINE SODIUM SCH MCG: 5 TABLET ORAL at 05:31

## 2021-05-20 RX ADMIN — BUDESONIDE AND FORMOTEROL FUMARATE DIHYDRATE SCH MG: 160; 4.5 AEROSOL RESPIRATORY (INHALATION) at 08:38

## 2021-05-20 RX ADMIN — INSULIN LISPRO SCH UNITS: 100 INJECTION, SOLUTION INTRAVENOUS; SUBCUTANEOUS at 07:00

## 2021-05-20 RX ADMIN — HEPARIN SODIUM SCH UNITS: 5000 INJECTION, SOLUTION INTRAVENOUS; SUBCUTANEOUS at 05:31

## 2021-05-20 RX ADMIN — LEVOTHYROXINE SODIUM ANHYDROUS SCH MCG: 100 INJECTION, POWDER, LYOPHILIZED, FOR SOLUTION INTRAVENOUS at 08:37

## 2021-05-20 RX ADMIN — PAROXETINE SCH MG: 10 TABLET, FILM COATED ORAL at 08:38

## 2021-05-20 NOTE — PROGRESS NOTES
2 RN Skin Assessment Completed by Jasmyn Mccallum, RN and Adriana Wynn RN.    Head: WDL   Ears: WDL  Nose: scab  Mouth: WDL  Neck: WDL  Breasts/Chest: WDL  Shoulder Blades: WDL  Spine: WDL  (R) Arm/Elbow/hand: edema  (L) Arm/Elbow/hand: edema  Abdomen:WDL  Groin: WDL  Sacrum/Coccyx/Buttocks: WDL  (R) Leg: scab  (L) Leg: scab  (R) Heel/Foot/Toe: WDL  (L) Heel/Foot/Toe: WDL          Devices in place: ECG, BP Cuff, Pulse Ox, Avila Temperature, SCD's, ET tube and OG/NG    Interventions in place: Heel Mepilex, Sacral Mepilex, Heel float boots, Pillows, Q2 turns and Barrier Cream    Possible skin injury found: Yes    Pictures uploaded into Epic: Yes  Wound Consult Placed: N/A      Right nostril has darkness to it, located near NG tube placement, picture taken, general skin, scabbed/scratches, no open wounds.

## 2021-05-20 NOTE — ASSESSMENT & PLAN NOTE
Apparently uncontrolled, as evidenced by elevated TSH at 15  Status post IV levothyroxine 50 mcg at outside facility  Continue home dose of levothyroxine, recheck TSH

## 2021-05-20 NOTE — CONSULTS
Pulmonary & Critical Care Consult Note    DATE OF CONSULTATION:  5/20/2021     CONSULTANT:  Dr. Jonathan Coppola      REASON FOR CONSULTATION:  Transfer from [facility] .Altered mental status , Intubated     HISTORY OF PRESENT ILLNESS:     60 y.o female with h/o multiple sclerosis with progressive decline in last 6 months ( [employer]), chronic pain(  Baclofen), depression, hypothyroidism ( levothyroxine )Breast cancer s/p bilateral mastectomy,  left heel malignant melanoma (radical wide excision 2016) is transferred from [facility] to [facility] on 05/20/21. Patient was found unresponsive at home by her  was admitted in [facility] for Altered mental status where she was intubated. She was started on IV solumedrol ( for 3 days doses) with concern for MS flare up. There was also concern that patient might have overdose or withdrawal on Baclofen.  At [facility] they held the baclofen yesterday but this am they found patient having myoclonic jerks so the medication was restarted. she was also hypothermic and bradycardic.CT head was normal. EEG was done but not read.  Blood cultures pending, TSh was 15 , cortisol normal , cpk in 200 s.    Discussed with  who requested to change the CODE STATUS to DNR.  He reports bottle of baclofen present at the side of the patient when he found her on floor but not sure if she overdosed on them. She had a tendency to take extra doses due to chronic pain but less likely an intension to self harm . patient has been somewhat depressed for a while and stating that she is suffering as lot due to MS but had no suicidal intentions.  He denies any history of seizures, loss of consciousness or head trauma in the recent past.  Has been also states that if she does not recover or go back to her previous baseline , she would not like it .       ROS:    Patient is intubated but off sedation she is able to open eyes, moving all extremities spontaneously but not able to follow commands.  Concern  for airway protection     Physical exam:  Patient is intubated  Pupil equal and reactive to light.  Mucous membranes are moist.  Normal gag and cough reflex.  Withdraws to pain in all extremities  Abdominal soft nondistended.  Normal bowel sounds  Lungs clear on auscultation bilaterally  Cardiovascular normal rate and rhythm.  No murmurs   Neuro: Patient alert oriented x 0 , not able to follow commands.  No jerking movements of seizure activities noted during the examination      PAST MEDICAL HISTORY:   Past Medical History:   Diagnosis Date   • Anesthesia     couldn't clear phlegm out of throat   • Arthritis     hands/legs   • Cancer (HCC) 2013    left breast, melanoma 2016   • Glaucoma     increased pressure from dental procedure, only transient, not an issue now   • MS (multiple sclerosis) ([facility])    • Pneumonia 2004   • Psychiatric problem     depression   • Unspecified disorder of thyroid     thyroidectomy        PAST SURGICAL HISTORY:   Past Surgical History:   Procedure Laterality Date   • MASTECTOMY Right 1/4/2017    Procedure: MASTECTOMY SIMPLE;  Surgeon: Chad Jones M.D.;  Location: SURGERY SAME DAY [facility];  Service:    • NODE BIOPSY SENTINEL Right 1/4/2017    Procedure: NODE BIOPSY SENTINEL AXILLARY;  Surgeon: Chad Jones M.D.;  Location: SURGERY SAME DAY [facility];  Service:    • IRRIGATION & DEBRIDEMENT ORTHO Left 6/8/2016    Procedure: IRRIGATION & DEBRIDEMENT ORTHO heel , vac placement  ;  Surgeon: Maxime Beltrán M.D.;  Location: [facility];  Service:    • WIDE EXCISION Left 4/22/2016    Procedure: WIDE EXCISION RADICAL HEEL MALIGNANT MELANOMA;  Surgeon: Dell Pantoja M.D.;  Location: [facility];  Service:    • MASS EXCISION ORTHO Left 4/22/2016    Procedure: MASS EXCISION ORTHO HEEL;  Surgeon: Maxime Beltrán M.D.;  Location: [facility];  Service:    • MASTECTOMY  8/7/2013    Performed by Puneet Jones M.D. at SURGERY SAME DAY  [facility]   • NODE BIOPSY SENTINEL  2013    Performed by Puneet Raymundo M.D. at SURGERY SAME DAY [facility]   • AXILLARY NODE DISSECTION  2013    Performed by Puneet Raymundo M.D. at SURGERY SAME DAY [facility]   • THYROIDECTOMY TOTAL  2012    Performed by PUNEET RAMYUNDO at SURGERY SAME DAY [facility]   • GYN SURGERY      tubal pregnancy        ALLERGIES:   Patient has no known allergies.     MEDICATIONS PRIOR TO ADMISSION:   No current facility-administered medications on file prior to encounter.     Current Outpatient Medications on File Prior to Encounter   Medication Sig Dispense Refill   • baclofen (LIORESAL) 20 MG tablet Take 20 mg by mouth 4 times a day.     • levothyroxine (SYNTHROID) 150 MCG Tab Take 150 mcg by mouth Every morning on an empty stomach.     • acetaminophen (TYLENOL) 325 MG Tab Take 650 mg by mouth every four hours as needed for Mild Pain.     • paroxetine (PAXIL) 30 MG TABS Take 45 mg by mouth every morning. Take with food         SOCIAL HISTORY:    Social History     Socioeconomic History   • Marital status:      Spouse name: Not on file   • Number of children: Not on file   • Years of education: Not on file   • Highest education level: Not on file   Occupational History   • Not on file   Tobacco Use   • Smoking status: Former Smoker     Packs/day: 1.00     Years: 20.00     Pack years: 20.00     Types: Cigarettes     Quit date: 1997     Years since quittin.8   • Smokeless tobacco: Never Used   Substance and Sexual Activity   • Alcohol use: No   • Drug use: No   • Sexual activity: Not on file   Other Topics Concern   • Not on file   Social History Narrative   • Not on file     Social Determinants of Health     Financial Resource Strain:    • Difficulty of Paying Living Expenses:    Food Insecurity:    • Worried About Running Out of Food in the Last Year:    • Ran Out of Food in the Last Year:    Transportation Needs:    • Lack of Transportation  (Medical):    • Lack of Transportation (Non-Medical):    Physical Activity:    • Days of Exercise per Week:    • Minutes of Exercise per Session:    Stress:    • Feeling of Stress :    Social Connections:    • Frequency of Communication with Friends and Family:    • Frequency of Social Gatherings with Friends and Family:    • Attends Anabaptism Services:    • Active Member of Clubs or Organizations:    • Attends Club or Organization Meetings:    • Marital Status:    Intimate Partner Violence:    • Fear of Current or Ex-Partner:    • Emotionally Abused:    • Physically Abused:    • Sexually Abused:        FAMILY HISTORY:   Family History   Problem Relation Age of Onset   • Cancer Maternal Aunt         ROS : unable to obtain due to acuity     PHYSICAL EXAMINATION:  Wt 85.3 kg (188 lb 0.8 oz)   BMI 35.05 kg/m²       LABORATORY DATA:    Lab Results   Component Value Date/Time    WBC 8.2 02/17/2021 02:20 PM    RBC 4.96 02/17/2021 02:20 PM    HEMOGLOBIN 14.8 02/17/2021 02:20 PM    HEMATOCRIT 44.2 02/17/2021 02:20 PM    MCV 89.1 02/17/2021 02:20 PM    MCH 29.8 02/17/2021 02:20 PM    MCHC 33.5 (L) 02/17/2021 02:20 PM    MPV 10.3 02/17/2021 02:20 PM    NEUTSPOLYS 73.50 (H) 02/17/2021 02:20 PM    LYMPHOCYTES 14.20 (L) 02/17/2021 02:20 PM    MONOCYTES 5.00 02/17/2021 02:20 PM    EOSINOPHILS 6.20 02/17/2021 02:20 PM    BASOPHILS 0.60 02/17/2021 02:20 PM    HYPOCHROMIA 1+ 07/30/2013 09:31 AM      Lab Results   Component Value Date/Time    SODIUM 140 02/17/2021 02:20 PM    POTASSIUM 4.0 02/17/2021 02:20 PM    CHLORIDE 101 02/17/2021 02:20 PM    CO2 27 02/17/2021 02:20 PM    GLUCOSE 86 02/17/2021 02:20 PM    BUN 20 02/17/2021 02:20 PM    CREATININE 1.03 02/17/2021 02:20 PM      Lab Results   Component Value Date/Time    PROTHROMBTM 13.2 05/09/2019 09:51 AM    INR 0.99 05/09/2019 09:51 AM         IMAGING:     DX-CHEST-PORTABLE (1 VIEW)    (Results Pending)          ASSESSMENT/PLAN:    # Acute encephalopathy -unclear etiology  metabolic versus toxic  #Acute respiratory failure with hypoxia  #Possible baclofen withdrawal causing seizure  #Multiple sclerosis    Patient on mechanical ventilator support.  Normal white cell count, no signs of infections chest x-ray.  Labs show normal electrolytes renal function, normal lactic acid level.  Check, TSH levels, free T4.  Patient has a history of thyroid surgery in chart supposed to be on 150 mcg of levothyroxine but as per plan ,patient was not taking any thyroid medications at home.  TSH of 15 at [place]  Neurology on board.   EEG ordered  MRI with and without contrast  If MRI is negative or inconclusive will consider LP  Seizure precautions  Restart home baclofen  Started on IV thiamine with concern for vitamin deficiency in multiple sclerosis possible poor oral intake    # Hypothyroidism     On chart review Synthyroid dose of 150 mcg daily but as per house patient has not been taking any thyroid medications.  At [place] was started on 50 mcg of Synthyroid.    TSH checked here 36.3   Free T4 is 0.2 and free T3 is low 0.9  Also as per chart review - hypothermic and bradycardic at [facility].  Plan:  Possible Myxedema Coma as a differential for encephalopathy  Will start on IV levothyroxine 200 mcg one time dose . Consider  mcg IV tomorrow.  Triiodothyronine 5 mcg in NG tube and then 2.5 mcg every 8 hours.  IV Hydrocortisone 100 mg every 8 hours x 3 doses        #Transaminitis    Continue to monitor electrolytes and liver function test.  US RUQ      DVTx: Lovenox   Code : DNAR/I ok   No antibiotics   Sedation and analgesic : propofol and fentanyl

## 2021-05-20 NOTE — PROCEDURES
ROUTINE ELECTROENCEPHALOGRAM REPORT      Referring provider: Dr. Sandra Zayas    DOS: 5/20/21 (total recording of 24 minutes)    INDICATION:  Lisa Chiang 60 y.o. female presenting with AMS    CURRENT ANTIEPILEPTIC REGIMEN: N/A    TECHNIQUE: 30 channel routine electroencephalogram (EEG) was performed in accordance with the international 10-20 system. The study was reviewed in bipolar and referential montages. The recording examined the patient during wakeful and comatose state     DESCRIPTION OF THE RECORD:  The EEG in this intubated and sedated patient shows a 4-5 Hz activity over the posterior head regions with intermixed 3 Hz delta. There is presence of generalized slowing with 5-6 Hz theta and occasional 2-3 hz delta. Right temporal focal slowing is seen. No epileptiform discharges or subclinical seizures were seen during this tracing. No organized sleep architecture was seen.     ACTIVATION PROCEDURES:   Photic stimulation did not produce a driving response. Hyperventilation was not performed.     EKG: sampling of the EKG recording demonstrated sinus tachycardia.       INTERPRETATION:  This is an abnormal EEG in an intubated/sedated patient due to a moderate degree of generalized slowing and slowing of the posterior dominant rhythm. These findings are suggestive of a moderate encephalopathy of nonspecific etiology. Right temporal focal slowing suggests a structural lesion involving the white matter of this region. No epileptiform discharges or subclinical seizures were seen during this tracing.     Juvenal Zayas M.D., Diplomat of the American Board of Psychiatry and Neurology  Diplomat of [facility]   Assistant Clinical Professor, [facility] Neurology Consultant

## 2021-05-20 NOTE — CARE PLAN
Problem: Safety - Medical Restraint  Goal: Remains free of injury from restraints (Restraint for Interference with Medical Device)  Outcome: Progressing  Interventions: skin assessed under device, range of motion performed, q2 hr turns, need explain to patient and family, family verbalizes understanding,     Problem: Skin Integrity  Goal: Skin integrity is maintained or improved  Outcome: Progressing  Interventions: patient turned q2 hrs, pillows in use for support, heel float boots in place, mepilex to sacrum and ankles for protections.        Problem: Mechanical Ventilation  Goal: Safe management of artificial airway and ventilation  Outcome: Progressing  Interventions: nurse and respiratory tech in agreement with treatment plan, continuous pulse ox in place, monitoring for compliance with ventilator.

## 2021-05-20 NOTE — ASSESSMENT & PLAN NOTE
Functional decline in the last 6 months reported as per medical records  Has been followed by Dr. Alcantara  Ordered MRI of the brain with and without contrast to evaluate progression  Neurology recommendations pending regarding continuation of high-dose of steroids

## 2021-05-20 NOTE — H&P
Hospital Medicine History & Physical Note    Date of Service  5/20/2021    Primary Care Physician  Eleanor Lorenzana D.O.    Consultants  PMA  Neurology    Code Status  Full code    Chief Complaint  Altered mental status    History of Presenting Illness  60 y.o. female with for progressive multiple sclerosis on Tecfidera following with Dr. Ruth, history of breast cancer status post bilateral mastectomy, depression, chronic pain, melanoma of heel, who presented 5/20/2021 as a transfer from [facility] where she was admitted yesterday with altered mental status.   Her  found her drooling slobber with  secretion around the side of her mouth, nonresponsive, and he called 911.  In ER her blood pressure was stable, but she was bradycardic with heart rate 49 and hypothermic with temperature 95.  She was intubated for airway protection.  According to her , he found empty bottle of baclofen with multiple baclofen pills on the table.  A dose of ingested baclofen is unknown.  Patient has been depressed, but denied directly suicidal ideations.    Apparently patient had a progressive functional decline over the last 6 months.      At baseline patient is able to walk with a walker and able to take care of diet animals and do some light housekeeping.  She does not drive and does not work.  Dr. Ruth was considering to repeat imaging and change her MS medications.  Last MRI of the brain was in December 2019   Patient found to be hypothermic and bradycardic in the emergency department.  Heart rate 49, temperature 95.  Blood pressure stable.  Intubated for airway protection..  CT head without contrast showed no acute abnormalities.  Chest x-ray and blood work did not reveal evidence of sepsis, myocardial ischemia or infection.   Cortisol level was normal.  UDS was positive for cannabinoids.  Ammonia is not elevated.  .  ALT slightly elevated at 97, AST 58, total bilirubin 0.8, blood sugar 144.   Otherwise no significant abnormality seen UA negative     After admission to ICU she was continued on mechanical ventilation patient was started on IV Solu-Medrol 1 g daily for possible MS exacerbation.  Her TSH was found to be 15 and patient was started on IV Synthroid 50 mcg.  .  Baclofen was discontinued due to concern for possible overdose.  Today patient noted to have myoclonic jerking and baclofen restarted in smaller dose per neurology recommendation.  EEG was done, but no neurologist available to read.    Subsequently patient was transferred to this hospital according to home health insurance coverage.  Patient is intubated and sedated.  She apparently is trying to open her eyes when asked to open her eyes.  She has spontaneous movement in all extremities.  EEG was done, result is pending  PMA/Dr. Coppola requested neurology consult, which is pending.         Review of Systems  Review of Systems   Unable to perform ROS: Intubated       Past Medical History   has a past medical history of Anesthesia, Arthritis, Cancer (HCC) (2013), Glaucoma, MS (multiple sclerosis) (HCC), Pneumonia (2004), Psychiatric problem, and Unspecified disorder of thyroid.    Surgical History   has a past surgical history that includes gyn surgery; thyroidectomy total (7/11/2012); mastectomy (8/7/2013); node biopsy sentinel (8/7/2013); axillary node dissection (8/7/2013); wide excision (Left, 4/22/2016); mass excision ortho (Left, 4/22/2016); irrigation & debridement ortho (Left, 6/8/2016); mastectomy (Right, 1/4/2017); and node biopsy sentinel (Right, 1/4/2017).     Family History  family history includes Cancer in her maternal aunt.     Social History   reports that she quit smoking about 23 years ago. Her smoking use included cigarettes. She has a 20.00 pack-year smoking history. She has never used smokeless tobacco. She reports that she does not drink alcohol and does not use drugs.    Allergies  No Known  Allergies    Medications  Prior to Admission Medications   Prescriptions Last Dose Informant Patient Reported? Taking?   acetaminophen (TYLENOL) 325 MG Tab  Patient Yes No   Sig: Take 650 mg by mouth every four hours as needed for Mild Pain.   baclofen (LIORESAL) 20 MG tablet   Yes No   Sig: Take 20 mg by mouth 4 times a day.   levothyroxine (SYNTHROID) 150 MCG Tab  Patient Yes No   Sig: Take 150 mcg by mouth Every morning on an empty stomach.   paroxetine (PAXIL) 30 MG TABS  Patient Yes No   Sig: Take 45 mg by mouth every morning. Take with food      Facility-Administered Medications: None       Physical Exam       Physical Exam  Vitals and nursing note reviewed.   Constitutional:       General: She is not in acute distress.     Interventions: She is sedated and intubated.   HENT:      Head: Normocephalic and atraumatic.      Nose: Nose normal.      Mouth/Throat:      Mouth: Mucous membranes are moist.   Eyes:      Extraocular Movements: Extraocular movements intact.      Pupils: Pupils are equal, round, and reactive to light.   Cardiovascular:      Rate and Rhythm: Normal rate and regular rhythm.   Pulmonary:      Effort: Pulmonary effort is normal. She is intubated.      Breath sounds: Normal breath sounds.   Abdominal:      General: Abdomen is flat. There is no distension.      Tenderness: There is no abdominal tenderness.   Genitourinary:     Comments: Avila catheter  Musculoskeletal:         General: No swelling or deformity. Normal range of motion.      Cervical back: Normal range of motion and neck supple.   Skin:     General: Skin is warm and dry.   Neurological:      General: No focal deficit present.      Mental Status: She is oriented to person, place, and time. She is lethargic.   Psychiatric:         Mood and Affect: Mood normal.         Behavior: Behavior normal.         Laboratory:          No results for input(s): ALTSGPT, ASTSGOT, ALKPHOSPHAT, TBILIRUBIN, DBILIRUBIN, GAMMAGT, AMYLASE, LIPASE, ALB,  PREALBUMIN, GLUCOSE in the last 72 hours.      No results for input(s): NTPROBNP in the last 72 hours.      No results for input(s): TROPONINT in the last 72 hours.    Imaging:  DX-CHEST-PORTABLE (1 VIEW)    (Results Pending)         Assessment/Plan:  I anticipate this patient will require at least two midnights for appropriate medical management, necessitating inpatient admission.    Altered mental status  Assessment & Plan  Etiology is unclear.  Baclofen overdose versus withdrawal, as well as MS flareup and hypothyroidism on differential.  Plan: MRI of the brain with and without contrast pending.  EEG reading pending.  Neurology recommendations pending  We will check TSH  Sedation medications as per PMA  Limit opiates and sedatives  Fall, aspiration, seizure precautions      Acute respiratory failure with hypoxia (HCC)  Assessment & Plan  Intubated for airway protection  Vent management per PMA  Sedation vacation is appropriate    Elevated LFTs  Assessment & Plan  Repeat CMP pending  Abdominal ultrasound ordered    Multiple sclerosis (HCC)  Assessment & Plan  Functional decline in the last 6 months reported as per medical records  Has been followed by Dr. Alcantara  Ordered MRI of the brain with and without contrast to evaluate progression  Neurology recommendations pending regarding continuation of high-dose of steroids    Hypothyroidism  Assessment & Plan  Apparently uncontrolled, as evidenced by elevated TSH at 15  Status post IV levothyroxine 50 mcg at outside facility  Continue home dose of levothyroxine, recheck TSH

## 2021-05-20 NOTE — ASSESSMENT & PLAN NOTE
Etiology is unclear.  Baclofen overdose versus withdrawal, as well as MS flareup and hypothyroidism on differential.  Plan: MRI of the brain with and without contrast pending.  EEG reading pending.  Neurology recommendations pending  We will check TSH  Sedation medications as per PMA  Limit opiates and sedatives  Fall, aspiration, seizure precautions

## 2021-05-21 PROBLEM — Z71.89 GOALS OF CARE, COUNSELING/DISCUSSION: Status: ACTIVE | Noted: 2021-01-01

## 2021-05-21 PROBLEM — E83.39 HYPOPHOSPHATEMIA: Status: ACTIVE | Noted: 2021-01-01

## 2021-05-21 PROBLEM — G93.40 ACUTE ENCEPHALOPATHY: Status: ACTIVE | Noted: 2021-01-01

## 2021-05-21 PROBLEM — E87.6 HYPOKALEMIA: Status: ACTIVE | Noted: 2021-01-01

## 2021-05-21 NOTE — PROCEDURES
Procedure Lumbar Puncture    Date/Time: 5/20/2021 6:18 PM  Performed by: Demetrius Membreno M.D.  Authorized by: Demetrius Membreno M.D.     Consent:     Consent obtained:  Written    Consent given by:  Spouse    Risks discussed:  Bleeding, infection, nerve damage and repeat procedure    Alternatives discussed:  No treatment  Universal protocol:     Procedure explained and questions answered to patient or proxy's satisfaction: yes      Relevant documents present and verified: yes      Test results available and properly labeled: yes      Imaging studies available: yes      Required blood products, implants, devices, and special equipment available: yes      Immediately prior to procedure a time out was called: yes      Site/side marked: yes      Patient identity confirmed:  Hospital-assigned identification number  Pre-procedure details:     Procedure purpose:  Diagnostic    Preparation: Patient was prepped and draped in usual sterile fashion    Anesthesia:     Anesthesia method:  Local infiltration    Local anesthetic:  Lidocaine 1% w/o epi  Procedure details:     Lumbar space:  L3-L4 interspace    Patient position:  Sitting    Needle gauge:  22    Needle type:  Spinal needle - Quincke tip    Needle length (in):  3.5    Ultrasound guidance: no      Number of attempts:  4    Fluid appearance:  Clear    Tubes of fluid:  4    Total volume (ml): 16.  Post-procedure:     Puncture site:  Adhesive bandage applied    Patient tolerance of procedure:  Tolerated well, no immediate complications

## 2021-05-21 NOTE — CARE PLAN
Ventilator Daily Summary    Vent Day # 2 ETT 8.0@ 24    Ventilator settings changed this shift: No    Weaning trials: No    Respiratory Procedures: None     Plan: Continue current ventilator settings and wean mechanical ventilation as tolerated per physician orders.

## 2021-05-21 NOTE — ASSESSMENT & PLAN NOTE
I spoke to the patient's  at the bedside on 5/21.  He explained her functionality:  Walks with a walker, family needed for most tasks, still dresses herself/grooms herself, cares for rescue pets, but there has been decline over the past 6 months.  He states that she does not have a POLST or Advanced Directive, but they have spoken about end of life decisions:  No long term vent support, no tracheostomy, and if patient cannot walk again (ie wheelchair bound) she would want comfort focused care/hospice.  At this point, Sabino, would like to see if she can recover enough over the next 1-2 days and see if she would be strong enough to extubate on her own; however, if it appears that this is not a possibility he would shift care to comfort-focused with compassionate extubation.    5/22 Patient extubated.  Zhou confirmed that she does not want the breathing tube to be replaced should she have issues.  She wants to be a full DNR/DNI.

## 2021-05-21 NOTE — CARE PLAN
The patient is Watcher - Medium risk of patient condition declining or worsening         Progress made toward(s) clinical / shift goals: Patient with q2 turns for skin integrity, heel and sacral mepilexes in place. Restraints checked.     Patient is not progressing towards the following goals:

## 2021-05-21 NOTE — CARE PLAN
Problem: Safety - Medical Restraint  Goal: Remains free of injury from restraints (Restraint for Interference with Medical Device)  5/21/2021 0948 by TREVON PabloN.  Outcome: Progressing  5/21/2021 0947 by Carine Robertson R.N.  Outcome: Progressing  Goal: Free from restraint(s) (Restraint for Interference with Medical Device)  5/21/2021 0948 by TREVON PabloN.  Outcome: Progressing  5/21/2021 0947 by TREVON PabloN.  Outcome: Progressing     Problem: Skin Integrity  Goal: Skin integrity is maintained or improved  5/21/2021 0948 by TREVON PabloN.  Outcome: Progressing  5/21/2021 0947 by TREVON PabloN.  Outcome: Progressing   The patient is Stable - Low risk of patient condition declining or worsening         Progress made toward(s) clinical / shift goals:  See previously filed note.     Patient is not progressing towards the following goals:

## 2021-05-21 NOTE — ASSESSMENT & PLAN NOTE
Suspect multifactorial:  ? Baclofen use as well as hypothyroidism vs MS exacerbation-->resolved and following commands  Cont synthroid  MRI/CT brain without pathology  Neuro following  Steroids  Limit sedation  LP negative

## 2021-05-21 NOTE — HOSPITAL COURSE
Mrs. Chiang is a 60 year old lady with the past medical history of glaucoma, osteoarthritis, hypothyroidism, and progressive multiple sclerosis with decline over the past 6 months per family who was found by her  unresponsive with spilled pill bottles and half of her baclofen missing.  She was taken to [facility] and intubated for airway protection.  She was transferred to [facility] as this is her preferred provider with insurance.  CT head was negative for acute pathology.  MRI brain without acute pathology.  EEG showing metabolic encephalopathy without seizures.  Family confirmed that the patient hadn't been taking her synthroid for significantly long time.  The patient is a confirmed DNAR.

## 2021-05-21 NOTE — DIETARY
"Nutrition Support Assessment   Day 1 of admit.  Lisa Chiang is a 60 y.o. female with admitting DX of Altered mental status  Current problem list:  : Patient admitted for altered mental status, intubated and on ventilator. Possible baclofen withdrawal/overdose. Upward gaze, pending eeg for assessment.    Pt remains on vent.  RD reviewed PMH and chart.     Assessment:  Estimated Nutritional Needs: based on:   Height: 160 cm (5' 3\") (per ID on file in Media tab)  Weight: 90.5 kg (199 lb 8.3 oz)  Ideal Body Weight: 52.2 kg (115 lb)  Percent Ideal Body Weight: 173.5  Body mass index is 35.34 kg/m²., BMI classification: Class II obesity    Calculation/Equation: REE per MSJ = 1446 kcal/day; RMR per [facility] (vent L/min 7.9, T max/24 hours 38.5) = 1718 kcal/day (65-70% = 0272-8722 kcal/day)  Total Calories / day: 996 - 1267 (Calories / k - 14)  Total Grams Protein / day: 104 (Grams Protein / kg IBW: 2.0)     Evaluation:   1. Pt is intubated and unable to take PO diet.  2. Orders received to start TF. Tube placement/confirmation is pending.   3. Estimated needs are based on ASPEN/SCCM guidelines for obesity. However, per RD judgment, lower-protein provision is adequate while on propofol: 1.0 gram protein/kg actual weight = 90.5 grams protein/day.  4. Reviewed labs.  5. Meds include, insulin, electrolyte replacement, propofol @ 30 mcg/kg/min (15.4 ml/hr = 407 kcal/day), bowel regimen, and IV thiamine.  6. Last BM   7. No wounds noted.  8. Low-calorie, low-CHO, high-protein TF formula will best meet pt's needs.   9. Do not need metabolic cart study.    Malnutrition Risk: None identified @ this time.     Recommendations/Plan:  1. Start Peptamen Intense VHP @ 25 ml/hr and advance per protocol to goal rate with propofol 40 ml/hr to provide 960 kcal (+Kcal from propofol), 88 grams protein, and 806 ml free water per day.  2. When propofol D/c'd, increase TF to final goal rate 50 ml/hr to provide 1200 kcal, 110 " grams protein, and 1008 ml free water per day.  3. Fluids per MD.    RD following.

## 2021-05-21 NOTE — PROGRESS NOTES
Pt switched over to MRI pump. Propofol mL's verified with Trevon Woodruff at 10.2 mL/hr.    00:00 Pt to MRI  Dr. Roberts notified that pt not laying still enough to complete MRI. Able to give contrast to get brain scanned however will have to wait for c-spine later on. Dr. Roberts okay with that decision.    01:30 Pt returned from OR.

## 2021-05-21 NOTE — CARE PLAN
Problem: Safety - Medical Restraint  Goal: Remains free of injury from restraints (Restraint for Interference with Medical Device)  Outcome: Progressing  Goal: Free from restraint(s) (Restraint for Interference with Medical Device)  Outcome: Progressing     Problem: Pain - Standard  Goal: Alleviation of pain or a reduction in pain to the patient’s comfort goal  Outcome: Progressing   The patient is Watcher - Medium risk of patient condition declining or worsening         Progress made toward(s) clinical / shift goals:      Patient is not progressing towards the following goals:

## 2021-05-21 NOTE — ASSESSMENT & PLAN NOTE
Intubated at [facility] for airway protection/hypoxia  Cont full vent support  RT/O2 protocols  Vent bundle protocols  I am actively adjusting vent settings based on ABGs/vent mechanics-->extubated  SATs/SBTs  **developed stridor:  Continue racemic epi nebulizers, s/p decadron 10mg IVP x 1, oxymask with cool mist

## 2021-05-21 NOTE — CONSULTS
Neurology Initial Consult H&P  Neurohospitalist Service, [facility]    Referring Physician: Christy Smith M.D.    Reason for Consult: Altered mental status and myoclonic jerks    HPI: Lisa Chiang is a 60 y.o. 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 Patient is currently intubated and sedated, thus history is provided by her  at bedside. Per the , the patient was sitting on the edge of the couch with her medications in front of her at approximately 01:00 on 5/20/21. The  heard a noise at 02:00, when to check on his wife and found her lying on the couch, unresponsive with sonorous shallow breathing, and drooling out of the side her of mouth. EMS was notified and reportedly found baclofen pills on the table and ground around the patient. She was taken to [facility] where she was intubated to airway protection, found to be hypothermic, bradycardic, and having myoclonic jerks. TSH was 15 and the patient received IV synthroid, otherwise lactic acid and CPK were normal. Patient was transferred to [facility] for higher level of care and Neurology was consulted for altered mental status and concern of possible seizures.     Currently the patient is lying in bed, intubated, sedated on Propofol 30mcg/kg/min, opens her eyes to repeated verbal stimuli, and does not follow commands. RN reported posturing earlier in shift with sedation lowered during SAT, but no seizure like activity. Her  at bedside stated she has been depressed recently as she is suffering of late from her MS and chronic pain, but he stated she did not have thoughts of harming herself or  others. Reportedly, the patient has a tendency to take extra doses of baclofen.     Past medical history:   Past Medical History:   Diagnosis Date   • Anesthesia     couldn't clear phlegm out of throat   • Arthritis     hands/legs   • Cancer (HCC) 2013    left breast, melanoma 2016   • Glaucoma     increased pressure from dental procedure, only transient, not an issue now   • MS (multiple sclerosis) ([facility])    • Pneumonia 2004   • Psychiatric problem     depression   • Unspecified disorder of thyroid     thyroidectomy       Past surgical history:   Past Surgical History:   Procedure Laterality Date   • MASTECTOMY Right 1/4/2017    Procedure: MASTECTOMY SIMPLE;  Surgeon: Chad Raymundo M.D.;  Location: SURGERY SAME DAY [facility];  Service:    • NODE BIOPSY SENTINEL Right 1/4/2017    Procedure: NODE BIOPSY SENTINEL AXILLARY;  Surgeon: Chad Raymundo M.D.;  Location: SURGERY SAME DAY [facility];  Service:    • IRRIGATION & DEBRIDEMENT ORTHO Left 6/8/2016    Procedure: IRRIGATION & DEBRIDEMENT ORTHO heel , vac placement  ;  Surgeon: Maxime Beltrán M.D.;  Location: SURGERY [facility];  Service:    • WIDE EXCISION Left 4/22/2016    Procedure: WIDE EXCISION RADICAL HEEL MALIGNANT MELANOMA;  Surgeon: Dell Pantoja M.D.;  Location: SURGERY [facility];  Service:    • MASS EXCISION ORTHO Left 4/22/2016    Procedure: MASS EXCISION ORTHO HEEL;  Surgeon: Maxime Beltrán M.D.;  Location: SURGERY [facility];  Service:    • MASTECTOMY  8/7/2013    Performed by Puneet Raymundo M.D. at SURGERY SAME DAY [facility]   • NODE BIOPSY SENTINEL  8/7/2013    Performed by Puneet Raymundo M.D. at SURGERY SAME DAY [facility]   • AXILLARY NODE DISSECTION  8/7/2013    Performed by Puneet Raymundo M.D. at SURGERY SAME DAY [facility]   • THYROIDECTOMY TOTAL  7/11/2012    Performed by PUNEET RAYMUNDO at SURGERY SAME DAY [facility]   • GYN SURGERY      tubal pregnancy       Family history:   Family  History   Problem Relation Age of Onset   • Cancer Maternal Aunt        Social history:   Social History     Socioeconomic History   • Marital status:      Spouse name: Not on file   • Number of children: Not on file   • Years of education: Not on file   • Highest education level: Not on file   Occupational History   • Not on file   Tobacco Use   • Smoking status: Former Smoker     Packs/day: 1.00     Years: 20.00     Pack years: 20.00     Types: Cigarettes     Quit date: 1997     Years since quittin.8   • Smokeless tobacco: Never Used   Substance and Sexual Activity   • Alcohol use: No   • Drug use: No   • Sexual activity: Not on file   Other Topics Concern   • Not on file   Social History Narrative   • Not on file     Social Determinants of Health     Financial Resource Strain:    • Difficulty of Paying Living Expenses:    Food Insecurity:    • Worried About Running Out of Food in the Last Year:    • Ran Out of Food in the Last Year:    Transportation Needs:    • Lack of Transportation (Medical):    • Lack of Transportation (Non-Medical):    Physical Activity:    • Days of Exercise per Week:    • Minutes of Exercise per Session:    Stress:    • Feeling of Stress :    Social Connections:    • Frequency of Communication with Friends and Family:    • Frequency of Social Gatherings with Friends and Family:    • Attends [employer] Services:    • Active Member of Clubs or Organizations:    • Attends Club or Organization Meetings:    • Marital Status:    Intimate Partner Violence:    • Fear of Current or Ex-Partner:    • Emotionally Abused:    • Physically Abused:    • Sexually Abused:        Allergies:  No Known Allergies    Medications:    Current Facility-Administered Medications:   •  [START ON 2021] levothyroxine (SYNTHROID) tablet 150 mcg, 150 mcg, Enteral Tube, AM Christy TAYLOR M.D.  •  Pharmacy Consult: Enteral tube insertion - review meds/change route/product selection, 1 Each, Other,  PHARMACY TO DOSE, Christy Smith M.D.  •  baclofen (LIORESAL) tablet 10 mg, 10 mg, Enteral Tube, 4X/DAY, Christy Smith M.D.  •  cloNIDine (CATAPRES) tablet 0.1 mg, 0.1 mg, Enteral Tube, Q6HRS PRN, Christy Smith M.D.  •  acetaminophen (Tylenol) tablet 650 mg, 650 mg, Enteral Tube, Q6HRS PRN, Christy Smith M.D.  •  ondansetron (ZOFRAN ODT) dispertab 4 mg, 4 mg, Enteral Tube, Q4HRS PRN, Christy Smith M.D.  •  promethazine (PHENERGAN) tablet 12.5-25 mg, 12.5-25 mg, Enteral Tube, Q4HRS PRN, Christy Smith M.D.  •  Respiratory Therapy Consult, , Nebulization, Continuous RT, Jonathan Coppola M.D.  •  famotidine (PEPCID) tablet 20 mg, 20 mg, Enteral Tube, Q12HRS **OR** famotidine (PEPCID) injection 20 mg, 20 mg, Intravenous, Q12HRS, Jonathan Coppola M.D., 20 mg at 05/21/21 0531  •  senna-docusate (PERICOLACE or SENOKOT S) 8.6-50 MG per tablet 2 tablet, 2 tablet, Enteral Tube, BID, 2 tablet at 05/21/21 0531 **AND** polyethylene glycol/lytes (MIRALAX) PACKET 1 Packet, 1 Packet, Enteral Tube, QDAY PRN **AND** magnesium hydroxide (MILK OF MAGNESIA) suspension 30 mL, 30 mL, Enteral Tube, QDAY PRN **AND** bisacodyl (DULCOLAX) suppository 10 mg, 10 mg, Rectal, QDAY PRN, Jonathan Coppola M.D.  •  MD Alert...ICU Electrolyte Replacement per Pharmacy, , Other, PHARMACY TO DOSE, Jonathan Coppola M.D.  •  lidocaine (XYLOCAINE) 1 % injection 1-2 mL, 1-2 mL, Tracheal Tube, Q30 MIN PRN, Jonathan Coppola M.D.  •  propofol (DIPRIVAN) injection, 0-80 mcg/kg/min, Intravenous, Continuous, Last Rate: 15.4 mL/hr at 05/21/21 1234, 30 mcg/kg/min at 05/21/21 1234 **AND** Triglycerides Starting now and then Every 3 Days, , , Every 3 Days (0300), Jonathan Coppola M.D.  •  fentaNYL (SUBLIMAZE) injection 25 mcg, 25 mcg, Intravenous, Q HOUR PRN **OR** fentaNYL (SUBLIMAZE) injection 50 mcg, 50 mcg, Intravenous, Q HOUR PRN **OR** fentaNYL (SUBLIMAZE) injection 100 mcg, 100 mcg, Intravenous, Q HOUR PRN, Jonathan Coppola M.D., 100 mcg at 05/20/21  1501  •  fentaNYL (SUBLIMAZE) 50 mcg/mL in 50mL, , Intravenous, Continuous, Jonathan Coppola M.D., Dose not Required at 05/20/21 1445  •  LORazepam (ATIVAN) injection 2-4 mg, 2-4 mg, Intravenous, Q5 MIN PRN, Jonathan Coppola M.D.  •  thiamine (B-1) 500 mg in dextrose 5% 100 mL IVPB, 500 mg, Intravenous, DAILY, Mary Kowalski M.D., Last Rate: 200 mL/hr at 05/21/21 0533, 500 mg at 05/21/21 0533  •  enoxaparin (LOVENOX) inj 40 mg, 40 mg, Subcutaneous, DAILY, Sammy Ross M.D., 40 mg at 05/21/21 0531  •  enalaprilat (VASOTEC) injection 1.25 mg, 1.25 mg, Intravenous, Q6HRS PRN, Sammy Ross M.D.  •  labetalol (NORMODYNE/TRANDATE) injection 10 mg, 10 mg, Intravenous, Q4HRS PRN, Sammy Ross M.D.  •  ondansetron (ZOFRAN) syringe/vial injection 4 mg, 4 mg, Intravenous, Q4HRS PRN, Sammy Ross M.D.  •  promethazine (PHENERGAN) suppository 12.5-25 mg, 12.5-25 mg, Rectal, Q4HRS PRN, Sammy Ross M.D.  •  prochlorperazine (COMPAZINE) injection 5-10 mg, 5-10 mg, Intravenous, Q4HRS PRN, Sammy Ross M.D.  •  insulin regular (HumuLIN R,NovoLIN R) injection, 2-9 Units, Subcutaneous, Q6HRS **AND** POC blood glucose manual result, , , Q6H **AND** NOTIFY MD and PharmD, , , Once **AND** [DISCONTINUED] glucose 4 g chewable tablet 16 g, 16 g, Oral, Q15 MIN PRN **AND** dextrose 50% (D50W) injection 50 mL, 50 mL, Intravenous, Q15 MIN PRN, Sammy Ross M.D.  •  NS infusion, , Intravenous, Continuous, Mayr Kowalski M.D., Last Rate: 100 mL/hr at 05/21/21 0532, New Bag at 05/21/21 0532  •  [COMPLETED] liothyronine (CYTOMEL) tablet 5 mcg, 5 mcg, Enteral Tube, Once, 5 mcg at 05/20/21 1839 **FOLLOWED BY** liothyronine (CYTOMEL) tablet 2.5 mcg, 2.5 mcg, Enteral Tube, TID, Mary Kowalski M.D., 2.5 mcg at 05/21/21 0814  •  hydrocortisone sodium succinate PF (SOLU-CORTEF) 100 MG injection 100 mg, 100 mg, Intravenous, Q8HRS, Mary Kowalski M.D., 100 mg at 05/21/21 0531    Review of systems: Unattainable secondary to intubation  and sedation.     Physical Examination:     Vitals:    05/21/21 1000 05/21/21 1015 05/21/21 1030 05/21/21 1040   BP: 130/72 118/71 115/73    Pulse: 73 71 71 74   Resp: (!) 10 (!) 9 (!) 9 13   TempSrc:       SpO2: 97% 98% 97% 98%   Weight:       Height:           General: Patient is in no acute distress, ill-appearing, intubated, sedated, and non-contributory.  HEENT: Normocephalic, no signs of acute trauma.   Neck: Supple, no meningeal signs or carotid bruits. There is normal range of motion. No tenderness on exam.   Chest: Regular and unlabored breaths on mechanical ventilation/intubation. Strong, productive cough with scant clear secretions and copious clear PO secretions.   CV: RRR.   Skin: No signs of acute rashes or trauma.   Musculoskeletal: Joints exhibit full range of motion, without any pain to palpation. There are no signs of joint or muscle swelling. There is no tenderness to deep palpation of muscles.   Psychiatric: No hallucinatory behavior. Unable to verbalize symptoms of depression or suicidal ideation secondary to intubation. Mood and affect appear sedated and withdrawn, then restless and agitated with sedation paused on exam.     NEUROLOGICAL EXAM: *Propofol paused for 5 minutes prior to exam.  Mental status, orientation: Drowsy/sedated, opens eyes to repeated verbal stimuli, does not follow commands, but withdrawals to pain. GCS E(3)V(NT)M(4)   Speech and language: Unattainable secondary to intubation.  Memory: Unattainable secondary to intubation.  Cranial nerve exam: Pupils are 3-4 mm bilaterally and equally reactive to light. Visual fields are intact by confrontation. Left and downward gaze preference, able to overcome midline but cannot bury sclera to right. Face appears symmetric. Patient is non-contributory to assess sensation in the face, uvula, palate, tongue, sternocleidomastoid muscles, or shoulder shrug secondary to intubation/sedation and AMS.   Motor exam: At least antigravity and  withdrawals to noxious stimuli in all 4 extremities. Tone is normal. No abnormal movements were seen on exam.   Sensory exam Withdrawals to noxious stimuli equally in all extremities.   Deep tendon reflexes:  Plantar responses are flexor. There is no clonus.   Coordination: Patient is non-contributory for coordination testing.   Gait: Deferred given critical acuity with high fall and injury risk.       ANCILLARY DATA REVIEWED:     Lab Data Review:  Recent Results (from the past 24 hour(s))   Triglycerides Starting now and then Every 3 Days    Collection Time: 05/20/21  4:29 PM   Result Value Ref Range    Triglycerides 235 (H) 0 - 149 mg/dL   Comp Metabolic Panel    Collection Time: 05/20/21  4:29 PM   Result Value Ref Range    Sodium 142 135 - 145 mmol/L    Potassium 3.5 (L) 3.6 - 5.5 mmol/L    Chloride 111 96 - 112 mmol/L    Co2 22 20 - 33 mmol/L    Anion Gap 9.0 7.0 - 16.0    Glucose 120 (H) 65 - 99 mg/dL    Bun 11 8 - 22 mg/dL    Creatinine 0.95 0.50 - 1.40 mg/dL    Calcium 8.4 (L) 8.5 - 10.5 mg/dL    AST(SGOT) 33 12 - 45 U/L    ALT(SGPT) 40 2 - 50 U/L    Alkaline Phosphatase 44 30 - 99 U/L    Total Bilirubin 0.7 0.1 - 1.5 mg/dL    Albumin 3.6 3.2 - 4.9 g/dL    Total Protein 5.8 (L) 6.0 - 8.2 g/dL    Globulin 2.2 1.9 - 3.5 g/dL    A-G Ratio 1.6 g/dL   CREATINE KINASE    Collection Time: 05/20/21  4:29 PM   Result Value Ref Range    CPK Total 162 (H) 0 - 154 U/L   LACTIC ACID    Collection Time: 05/20/21  4:29 PM   Result Value Ref Range    Lactic Acid 1.3 0.5 - 2.0 mmol/L   FREE THYROXINE    Collection Time: 05/20/21  4:29 PM   Result Value Ref Range    Free T-4 0.20 (L) 0.93 - 1.70 ng/dL   T3 FREE    Collection Time: 05/20/21  4:29 PM   Result Value Ref Range    T3,Free 0.96 (L) 2.00 - 4.40 pg/mL   TSH    Collection Time: 05/20/21  4:29 PM   Result Value Ref Range    TSH 36.300 (H) 0.380 - 5.330 uIU/mL   ESTIMATED GFR    Collection Time: 05/20/21  4:29 PM   Result Value Ref Range    GFR If  >60  >60 mL/min/1.73 m 2    GFR If Non African American 60 >60 mL/min/1.73 m 2   POCT glucose device results    Collection Time: 05/20/21  5:32 PM   Result Value Ref Range    Glucose - Accu-Ck 111 (H) 65 - 99 mg/dL   CSF Glucose    Collection Time: 05/20/21  6:24 PM   Result Value Ref Range    Glucose CSF 77 40 - 80 mg/dL   CSF Protein    Collection Time: 05/20/21  6:24 PM   Result Value Ref Range    Total Protein, CSF 55 (H) 15 - 45 mg/dL   CSF Cell Count    Collection Time: 05/20/21  6:24 PM   Result Value Ref Range    Number Of Tubes 4     Volume 15.0 mL    Color-Body Fluid Colorless     Character-Body Fluid Clear     Supernatant Appearance Colorless     Total RBC Count 3 cells/uL    Crenated RBC 0 %    Total WBC Count 1 0 - 10 cells/uL    Lymphs 79 %    Mononuclear Cells - CSF 19 %    Unidentified Cells - CSF 2 %    Comments see below     CSF Tube Number 3    MENINGITIS/ENCEPHALITIS CSF PANEL BY PCR    Collection Time: 05/20/21  6:27 PM   Result Value Ref Range    Cryptococcus neoformans/gattii by PCR Not Detected     Cytomegalovirus by PCR Not Detected     Enterovirus by PCR Not Detected     Escherichia coli K1 by PCR Not Detected     HAEM influenzae by PCR Not Detected     HSV 1 by PCR Not Detected     HSV 2 by PCR Not Detected     Human Herpesvirus 6 by PCR Not Detected     Human parechovirus by PCR Not Detected     Listeria Monocytogenes by PCR Not Detected     Neisseria meningitidis by PCR Not Detected     Strep Agalactiae by PCR Not Detected     Strep pneumoniae by PCR Not Detected     Varicella Zoster Virus by PCR Not Detected    CSF Culture    Collection Time: 05/20/21  6:27 PM    Specimen: Tap; CSF   Result Value Ref Range    Significant Indicator NEG     Source CSF     Site TAP     Culture Result -     Gram Stain Result Rare WBCs.  No organisms seen.      GRAM STAIN    Collection Time: 05/20/21  6:27 PM    Specimen: CSF   Result Value Ref Range    Significant Indicator .     Source CSF     Site TAP     Gram  Stain Result Rare WBCs.  No organisms seen.      POCT glucose device results    Collection Time: 05/21/21  1:44 AM   Result Value Ref Range    Glucose - Accu-Ck 137 (H) 65 - 99 mg/dL   POCT arterial blood gas device results    Collection Time: 05/21/21  2:10 AM   Result Value Ref Range    Ph 7.490 7.400 - 7.500    Pco2 23.5 (L) 26.0 - 37.0 mmHg    Po2 66 64 - 87 mmHg    Tco2 19 (L) 20 - 33 mmol/L    S02 95 93 - 99 %    Hco3 17.9 17.0 - 25.0 mmol/L    BE -4 -4 - 3 mmol/L    Body Temp 37.7 C degrees    O2 Therapy 30 %    iPF Ratio 220     Ph Temp Saran 7.479 7.400 - 7.500    Pco2 Temp Co 24.2 (L) 26.0 - 37.0 mmHg    Po2 Temp Cor 69 64 - 87 mmHg    Specimen Arterial     DelSys Vent     End Tidal Carbon Dioxide 22 mmhg    Peep End Expiratory Pressure 8 cmh20    Percent Minute Volume 140     Mode ASV    CBC with Differential    Collection Time: 05/21/21  4:25 AM   Result Value Ref Range    WBC 8.3 4.8 - 10.8 K/uL    RBC 3.22 (L) 4.20 - 5.40 M/uL    Hemoglobin 10.4 (L) 12.0 - 16.0 g/dL    Hematocrit 30.3 (L) 37.0 - 47.0 %    MCV 94.1 81.4 - 97.8 fL    MCH 32.3 27.0 - 33.0 pg    MCHC 34.3 33.6 - 35.0 g/dL    RDW 46.8 35.9 - 50.0 fL    Platelet Count 131 (L) 164 - 446 K/uL    MPV 10.5 9.0 - 12.9 fL    Neutrophils-Polys 88.60 (H) 44.00 - 72.00 %    Lymphocytes 4.80 (L) 22.00 - 41.00 %    Monocytes 5.80 0.00 - 13.40 %    Eosinophils 0.00 0.00 - 6.90 %    Basophils 0.10 0.00 - 1.80 %    Immature Granulocytes 0.70 0.00 - 0.90 %    Nucleated RBC 0.00 /100 WBC    Neutrophils (Absolute) 7.32 (H) 2.00 - 7.15 K/uL    Lymphs (Absolute) 0.40 (L) 1.00 - 4.80 K/uL    Monos (Absolute) 0.48 0.00 - 0.85 K/uL    Eos (Absolute) 0.00 0.00 - 0.51 K/uL    Baso (Absolute) 0.01 0.00 - 0.12 K/uL    Immature Granulocytes (abs) 0.06 0.00 - 0.11 K/uL    NRBC (Absolute) 0.00 K/uL   Basic Metabolic Panel (BMP)    Collection Time: 05/21/21  4:25 AM   Result Value Ref Range    Sodium 133 (L) 135 - 145 mmol/L    Potassium 3.7 3.6 - 5.5 mmol/L    Chloride  108 96 - 112 mmol/L    Co2 20 20 - 33 mmol/L    Glucose 131 (H) 65 - 99 mg/dL    Bun 9 8 - 22 mg/dL    Creatinine 0.90 0.50 - 1.40 mg/dL    Calcium 7.5 (L) 8.5 - 10.5 mg/dL    Anion Gap 5.0 (L) 7.0 - 16.0   Magnesium    Collection Time: 05/21/21  4:25 AM   Result Value Ref Range    Magnesium 2.1 1.5 - 2.5 mg/dL   Phosphorus    Collection Time: 05/21/21  4:25 AM   Result Value Ref Range    Phosphorus 2.2 (L) 2.5 - 4.5 mg/dL   CORTISOL    Collection Time: 05/21/21  4:25 AM   Result Value Ref Range    Cortisol 25.1 (H) 0.0 - 23.0 ug/dL   ESTIMATED GFR    Collection Time: 05/21/21  4:25 AM   Result Value Ref Range    GFR If African American >60 >60 mL/min/1.73 m 2    GFR If Non African American >60 >60 mL/min/1.73 m 2   POCT glucose device results    Collection Time: 05/21/21  5:37 AM   Result Value Ref Range    Glucose - Accu-Ck 129 (H) 65 - 99 mg/dL   POCT glucose device results    Collection Time: 05/21/21 12:32 PM   Result Value Ref Range    Glucose - Accu-Ck 128 (H) 65 - 99 mg/dL         Imaging/Testing:    I interpreted and/or reviewed the patient's neuroimaging    DX-ABDOMEN FOR TUBE PLACEMENT   Final Result      Enteric feeding tube tip terminates projecting over the second portion of the duodenum.      DX-CHEST-PORTABLE (1 VIEW)   Final Result         1.  No acute cardiopulmonary disease.      MR-BRAIN-WITH & W/O   Final Result      1.  There are multifocal abnormal T2 hyperintensities in the subcortical and periventricular white matter and brainstem consistent with chronic demyelinating plaques of multiple sclerosis. None of the lesions demonstrates contrast enhancement. There has    been no significant interval change.   2.  Mild-to-moderate cerebral volume loss.   3.  There is T2 hyperintensity in the bilateral temporomandibular joint, more so on the left side likely representing degeneration.      US-RUQ   Final Result      Wall echo shadow complex compatible with the presence of multiple gallstones. There  is gallbladder wall thickening but no pericholecystic fluid. Gallbladder wall thickening can be seen in cholecystitis, hepatitis, cirrhosis and hypoproteinemia. Shadowing    from a porcelain  gallbladder is also considered.      No biliary ductal dilatation is seen.      Pancreas and distal aorta are obscured by overlying bowel gas, limiting evaluation.      Right pleural effusion.      DX-CHEST-PORTABLE (1 VIEW)   Final Result      1.  Supportive tubing as described above.   2.  No pneumothorax.   3.  Hypoinflation without other evidence for acute cardiopulmonary disease.      MR-CERVICAL SPINE-W/O    (Results Pending)         Assessment and Plan:    Lisa Chiang is a 60 y.o. female with relevant history of multiple sclerosis on Tecfidera, chronic pain on Baclofen, depression, hypothyroidism s/p thyroidectomy, breast cancer s/p bilateral mastectomy (2017), left heel malignant melanoma s/p radical wide excision (2016) presenting to [facility] as transfer from [facility] on 5/20/21 for altered mental status. Neurology was consulted for altered mental status and concern of possible seizures. Upon exam, patient appears encephalopathic with inability to follow commands, drowsy/sedated, but no focal neurological deficits or seizure like activity noted. MRI brain w/wo contrast showed no acute abnormalities with T2 hyperintensities consistent with chronic microvascular ischemic and chronic MS lesions. Routine VEEG was abnormal in a sedated patient with generalized slowing suggestive of moderate encephalopathy and right temporal focal slowing consistent with structural lesion of the white matter, but no epileptiform discharges or seizures. These findings are consistent with current neurological exam and the focal slowing may be explained by chronic MS lesions. The myoclonic jerks seen are more likely due to anoxic injury. Given the patient was found with baclofen tablets around  her with history of chronic pain, taking extra doses of medications, and depression, the etiology of her encephalopathy is likely accidental versus intentional overdose. Additionally, TSH was found to be elevated at 36 with T4 low at 0.2 and T3 low at 0.96 consistent with hypothyroidism, which may also explain her encephalopathy.     Plan:     -q4h and PRN neuro assessment. VS per nursing/unit protocol.   -Telemetry and pulse oximetry; currently SR.   -No AEDs indicated given no seizure activity on EEG or clinically on exam   -Attempt to minimize risk of delirium such as promote night time sleep, monitor for constipation, remove lines/tubing that is not needed, avoid early lab draws and vital checks, limit polypharmacy as able, and keep close to the window  -PT/OT/SLP eval and treat once clinically appropriate.   -All other medical management per ICU/primary team.   -DVT PPX: SCDs.     No further recommendations or further studies from a neurological standpoint at this time. Please re-consult if you have further questions or there is a change in status.     The evaluation of the patient, and recommended management, was discussed with Dr. Sandra Zayas, Dr. Smith, and bedside RN.     CAROL Perkins.   Nurse Practitioner, Neurohospitalist  [facility]  t) [phone] (f) [phone]

## 2021-05-21 NOTE — PROGRESS NOTES
Critical Care Progress Note    Date of admission  5/20/2021    Chief Complaint  60 y.o. female admitted 5/20/2021 with acute encephalopathy requiring intubation for hypoxia and airway protection    Hospital Course  Mrs. Chiang is a 60 year old lady with the past medical history of glaucoma, osteoarthritis, hypothyroidism, and progressive multiple sclerosis with decline over the past 6 months per family who was found by her  unresponsive with spilled pill bottles and half of her baclofen missing.  She was taken to [facility] and intubated for airway protection.  She was transferred to [facility] as this is her preferred provider with insurance.  CT head was negative for acute pathology.  MRI brain without acute pathology.  EEG showing metabolic encephalopathy without seizures.  Family confirmed that the patient hadn't been taking her synthroid for significantly long time.  The patient is a confirmed DNAR.      Interval Problem Update  Reviewed last 24 hour events:   - no issues overnight   - sedation off:  Grasping with right, wiggling toes, not moving left-->?posturing of LUE per nursing observation   - SR 70-80s   - -130s   - afebrile   - right NG in place, NPO   - BM yesterday   - UOP of 600cc overnight with white   - Propofol at 30   - NS at 100cc/hr   - mobilize with ROM to extremities-->edge of bed   - vent day 2   - CXR(reviewed):  ? Right pleural effusion vs atelectasis with clear lung fields   - attempt SBT   - lovenox/pepcid   - hydrocortisone 100mg TID   - K 3.7   - phos 2.2    Review of Systems  Review of Systems   Unable to perform ROS: Intubated        Vital Signs for last 24 hours   Pulse:  [70-93] 75  Resp:  [4-25] 9  BP: ()/(52-71) 120/67  SpO2:  [94 %-100 %] 98 %    Hemodynamic parameters for last 24 hours       Respiratory Information for the last 24 hours  Vent Mode: ASV  Rate (breaths/min): 16  Vt Target (mL): 450  PEEP/CPAP: 8  MAP: 11  Control VTE (exp VT):  326    Physical Exam   Physical Exam  Vitals and nursing note reviewed.   Constitutional:       General: She is not in acute distress.     Appearance: She is obese. She is ill-appearing. She is not toxic-appearing.      Comments: Mildly sedated, tries to keep eyes closed when attempting to open them   HENT:      Head: Normocephalic and atraumatic.      Right Ear: External ear normal.      Left Ear: External ear normal.      Nose: Nose normal. No rhinorrhea.      Mouth/Throat:      Mouth: Mucous membranes are moist.      Comments: ETT in place  Eyes:      General: No scleral icterus.     Conjunctiva/sclera: Conjunctivae normal.      Pupils: Pupils are equal, round, and reactive to light.   Cardiovascular:      Rate and Rhythm: Normal rate and regular rhythm.      Pulses: Normal pulses.      Heart sounds: Normal heart sounds. No murmur heard.     Pulmonary:      Effort: No respiratory distress.      Breath sounds: Normal breath sounds. No wheezing.      Comments: Breathing comfortably on the vent   Chest:      Chest wall: No tenderness.   Abdominal:      General: Bowel sounds are normal. There is no distension.      Palpations: Abdomen is soft.      Tenderness: There is no abdominal tenderness. There is no guarding or rebound.   Genitourinary:     Comments: Avila in place  Musculoskeletal:         General: No deformity.      Cervical back: Normal range of motion and neck supple.      Right lower leg: No edema.      Left lower leg: No edema.      Comments: Moving RLE/LLE/RUE with FROM, not moving LUE   Lymphadenopathy:      Cervical: No cervical adenopathy.   Skin:     General: Skin is warm and dry.      Capillary Refill: Capillary refill takes less than 2 seconds.      Findings: No rash.   Neurological:      Cranial Nerves: No cranial nerve deficit.      Sensory: No sensory deficit.      Comments: Moving all with FROM except LUE, following commands:  Wiggling toes, symmetrical facial expressions   Psychiatric:       Comments: Unable to assess         Medications  Current Facility-Administered Medications   Medication Dose Route Frequency Provider Last Rate Last Admin   • Respiratory Therapy Consult   Nebulization Continuous RT Jonathan Coppola M.D.       • famotidine (PEPCID) tablet 20 mg  20 mg Enteral Tube Q12HRS Jonathan Coppola M.D.        Or   • famotidine (PEPCID) injection 20 mg  20 mg Intravenous Q12HRS Jonathan Coppola M.D.   20 mg at 05/21/21 0531   • senna-docusate (PERICOLACE or SENOKOT S) 8.6-50 MG per tablet 2 tablet  2 tablet Enteral Tube BID Jonathan Coppola M.D.   2 tablet at 05/21/21 0531    And   • polyethylene glycol/lytes (MIRALAX) PACKET 1 Packet  1 Packet Enteral Tube QDAY PRN Jonathan Coppola M.D.        And   • magnesium hydroxide (MILK OF MAGNESIA) suspension 30 mL  30 mL Enteral Tube QDAY PRN Jonathan Coppola M.D.        And   • bisacodyl (DULCOLAX) suppository 10 mg  10 mg Rectal QDAY PRN Jonathan Coppola M.D.       • MD Alert...ICU Electrolyte Replacement per Pharmacy   Other PHARMACY TO DOSE Jonathan Coppola M.D.       • lidocaine (XYLOCAINE) 1 % injection 1-2 mL  1-2 mL Tracheal Tube Q30 MIN PRN Jonathan Coppola M.D.       • propofol (DIPRIVAN) injection  0-80 mcg/kg/min Intravenous Continuous Jonathan Coppola M.D. 15.4 mL/hr at 05/21/21 0532 30 mcg/kg/min at 05/21/21 0532   • fentaNYL (SUBLIMAZE) injection 25 mcg  25 mcg Intravenous Q HOUR PRN Jonathan Coppola M.D.        Or   • fentaNYL (SUBLIMAZE) injection 50 mcg  50 mcg Intravenous Q HOUR PRN Jonathan Coppola M.D.        Or   • fentaNYL (SUBLIMAZE) injection 100 mcg  100 mcg Intravenous Q HOUR PRN Jonathan Coppola M.D.   100 mcg at 05/20/21 1501   • fentaNYL (SUBLIMAZE) 50 mcg/mL in 50mL   Intravenous Continuous Jonathan Coppola M.D.   Dose not Required at 05/20/21 1445   • LORazepam (ATIVAN) injection 2-4 mg  2-4 mg Intravenous Q5 MIN PRN Jonathan Coppola M.D.       • thiamine (B-1) 500 mg in dextrose 5% 100 mL IVPB  500 mg Intravenous DAILY Mary Kowalski M.D. 200 mL/hr  at 05/21/21 0533 500 mg at 05/21/21 0533   • baclofen (LIORESAL) tablet 10 mg  10 mg Oral 4X/DAY Mary Kowalski M.D.   10 mg at 05/20/21 2107   • enoxaparin (LOVENOX) inj 40 mg  40 mg Subcutaneous DAILY Sammy Ross M.D.   40 mg at 05/21/21 0531   • cloNIDine (CATAPRES) tablet 0.1 mg  0.1 mg Oral Q6HRS PRN Sammy Ross M.D.       • enalaprilat (VASOTEC) injection 1.25 mg  1.25 mg Intravenous Q6HRS PRN Sammy Ross M.D.       • labetalol (NORMODYNE/TRANDATE) injection 10 mg  10 mg Intravenous Q4HRS PRN Sammy Ross M.D.       • ondansetron (ZOFRAN) syringe/vial injection 4 mg  4 mg Intravenous Q4HRS PRN Sammy Ross M.D.       • ondansetron (ZOFRAN ODT) dispertab 4 mg  4 mg Oral Q4HRS PRN Sammy Ross M.D.       • promethazine (PHENERGAN) tablet 12.5-25 mg  12.5-25 mg Oral Q4HRS PRN Sammy Ross M.D.       • promethazine (PHENERGAN) suppository 12.5-25 mg  12.5-25 mg Rectal Q4HRS PRN Sammy Ross M.D.       • prochlorperazine (COMPAZINE) injection 5-10 mg  5-10 mg Intravenous Q4HRS PRN Sammy Ross M.D.       • acetaminophen (Tylenol) tablet 650 mg  650 mg Oral Q6HRS PRN Sammy Ross M.D.   650 mg at 05/20/21 1716   • insulin regular (HumuLIN R,NovoLIN R) injection  2-9 Units Subcutaneous Q6HRS Sammy Ross M.D.        And   • glucose 4 g chewable tablet 16 g  16 g Oral Q15 MIN PRN Sammy Ross M.D.        And   • dextrose 50% (D50W) injection 50 mL  50 mL Intravenous Q15 MIN PRN Sammy Ross M.D.       • NS infusion   Intravenous Continuous Mary Kowalski M.D. 100 mL/hr at 05/21/21 0532 New Bag at 05/21/21 0532   • liothyronine (CYTOMEL) tablet 2.5 mcg  2.5 mcg Enteral Tube TID Mary Kowalski M.D.       • hydrocortisone sodium succinate PF (SOLU-CORTEF) 100 MG injection 100 mg  100 mg Intravenous Q8HRS Mary Kowalski M.D.   100 mg at 05/21/21 0531   • levothyroxine (SYNTHROID) injection 50 mcg  50 mcg Intravenous Once Mary Kowalski M.D.            Fluids    Intake/Output Summary (Last 24 hours) at 5/21/2021 0726  Last data filed at 5/21/2021 0600  Gross per 24 hour   Intake 1703.41 ml   Output 1205 ml   Net 498.41 ml       Laboratory  Recent Labs     05/21/21  0210   ISTATAPH 7.490   ISTATAPCO2 23.5*   ISTATAPO2 66   ISTATATCO2 19*   VKSKFKM1FRJ 95   ISTATARTHCO3 17.9   ISTATARTBE -4   ISTATTEMP 37.7 C   ISTATFIO2 30   ISTATSPEC Arterial   ISTATAPHTC 7.479   RFTQGFRM3DM 69     Recent Labs     05/20/21  1629   CPKTOTAL 162*     Recent Labs     05/20/21 1629 05/21/21  0425   SODIUM 142 133*   POTASSIUM 3.5* 3.7   CHLORIDE 111 108   CO2 22 20   BUN 11 9   CREATININE 0.95 0.90   MAGNESIUM  --  2.1   PHOSPHORUS  --  2.2*   CALCIUM 8.4* 7.5*     Recent Labs     05/20/21 1629 05/21/21  0425   ALTSGPT 40  --    ASTSGOT 33  --    ALKPHOSPHAT 44  --    TBILIRUBIN 0.7  --    GLUCOSE 120* 131*     Recent Labs     05/20/21 1629 05/21/21  0425   WBC  --  8.3   NEUTSPOLYS  --  88.60*   LYMPHOCYTES  --  4.80*   MONOCYTES  --  5.80   EOSINOPHILS  --  0.00   BASOPHILS  --  0.10   ASTSGOT 33  --    ALTSGPT 40  --    ALKPHOSPHAT 44  --    TBILIRUBIN 0.7  --      Recent Labs     05/21/21  0425   RBC 3.22*   HEMOGLOBIN 10.4*   HEMATOCRIT 30.3*   PLATELETCT 131*       Imaging  RUQ US:  Wall echo shadow complex compatible with the presence of multiple gallstones. There is gallbladder wall thickening but no pericholecystic fluid. Gallbladder wall thickening can be seen in cholecystitis, hepatitis, cirrhosis and hypoproteinemia. Shadowing   from a porcelain  gallbladder is also considered.     No biliary ductal dilatation is seen.     Pancreas and distal aorta are obscured by overlying bowel gas, limiting evaluation.     Right pleural effusion.    MRI brain:  1.  There are multifocal abnormal T2 hyperintensities in the subcortical and periventricular white matter and brainstem consistent with chronic demyelinating plaques of multiple sclerosis. None of the lesions  demonstrates contrast enhancement. There has   been no significant interval change.  2.  Mild-to-moderate cerebral volume loss.  3.  There is T2 hyperintensity in the bilateral temporomandibular joint, more so on the left side likely representing degeneration.    Assessment/Plan  Goals of care, counseling/discussion  Assessment & Plan  I spoke to the patient's  at the bedside on 5/21.  He explained her functionality:  Walks with a walker, family needed for most tasks, still dresses herself/grooms herself, cares for rescue pets, but there has been decline over the past 6 months.  He states that she does not have a POLST or Advanced Directive, but they have spoken about end of life decisions:  No long term vent support, no tracheostomy, and if patient cannot walk again (ie wheelchair bound) she would want comfort focused care/hospice.  At this point, Sabino, would like to see if she can recover enough over the next 1-2 days and see if she would be strong enough to extubate on her own; however, if it appears that this is not a possibility he would shift care to comfort-focused with compassionate extubation.    Hypokalemia  Assessment & Plan  Replete to goal > 4    Hypophosphatemia- (present on admission)  Assessment & Plan  Replete to goal > 2.5    Acute encephalopathy- (present on admission)  Assessment & Plan  Suspect multifactorial:  ? Baclofen use as well as hypothyroidism vs MS exacerbation  Cont synthroid  MRI/CT brain without pathology  Neuro following  Steroids  Limit sedation  LP negative    Acute respiratory failure with hypoxia (HCC)  Assessment & Plan  Intubated at [facility] for airway protection/hypoxia  Cont full vent support  RT/O2 protocols  Vent bundle protocols  I am actively adjusting vent settings based on ABGs/vent mechanics  SATs/SBTs    Multiple sclerosis (HCC)  Assessment & Plan  ? Exacerbation  Neurology following  Cont home baclofen to avoid withdrawal syndrome  Hydrocortisone 100mg TID x 1  day  Progressive per  with decline in function over the past 6 months    Hypothyroidism  Assessment & Plan  ?myedema coma  S/p IV synthroid  Cont synthroid 150mcg daily  Cont cytomel       VTE:  Lovenox  Ulcer: H2 Antagonist  Lines: Avila Catheter  Ongoing indication addressed and PIVs    I have performed a physical exam and reviewed and updated ROS and Plan today (5/21/2021). In review of yesterday's note (5/20/2021), there are no changes except as documented above.     Discussed patient condition and risk of morbidity and/or mortality with Family, RN, RT, Pharmacy, Charge nurse / hot rounds, Patient and neurology  The patient remains critically ill still requiring active titration and adjustments to the ventilator for airway protection as well as hypoxemia.  The patient remains at high risk of clinical deterioration, worsening vital organ dysfunction, and death of the above critical care interventions.    Critical care time = 48 minutes in directly providing and coordinating critical care and extensive data review.  No time overlap and excludes procedures.

## 2021-05-21 NOTE — ASSESSMENT & PLAN NOTE
? Exacerbation  Neurology following  Cont home baclofen to avoid withdrawal syndrome  Hydrocortisone 100mg TID x 1 day  Progressive per  with decline in function over the past 6 months.

## 2021-05-21 NOTE — CARE PLAN
Problem: Nutritional:  Goal: Nutrition support tolerated and meeting greater than 85% of estimated needs  Outcome: Not Met

## 2021-05-22 PROBLEM — J95.89 POSTEXTUBATION STRIDOR: Status: ACTIVE | Noted: 2021-01-01

## 2021-05-22 NOTE — RESPIRATORY CARE
Extubated patient per MD order. Cuff leak performed. Placed patient on 4L nasal cannula. Gave patient two racemic treatments for stridor.

## 2021-05-22 NOTE — PROGRESS NOTES
Critical Care Progress Note    Date of admission  5/20/2021    Chief Complaint  60 y.o. female admitted 5/20/2021 with acute encephalopathy requiring intubation for hypoxia and airway protection    Hospital Course  Mrs. Chiang is a 60 year old lady with the past medical history of glaucoma, osteoarthritis, hypothyroidism, and progressive multiple sclerosis with decline over the past 6 months per family who was found by her  unresponsive with spilled pill bottles and half of her baclofen missing.  She was taken to [facility] [place] and intubated for airway protection.  She was transferred to [facility] as this is her preferred provider with insurance.  CT head was negative for acute pathology.  MRI brain without acute pathology.  EEG showing metabolic encephalopathy without seizures.  Family confirmed that the patient hadn't been taking her synthroid for significantly long time.  The patient is a confirmed DNAR.      Interval Problem Update  Reviewed last 24 hour events:   - no overnight events   - SBT this am-->great parameters and extubated   - now having stridor-->racemic epi nebs x 3   - pt in no distress   - a/ox3-4   - SR 70-80s   - SBP 90-120s   - afebrile   - O2 at 10 lpm oxymaks with cool mist   - bed rest   - CXR(reviewed): clear lung fields   - s/p decadron 10mg IVP for stridor   - K 3.2   - Mg 2.2   - lovenox    **extubated:  Called to the bedside about 10 minutes post extubation with patient having stridor.  Racemic epi given, Decadron 10mg IVP.  Patient without distress or desaturations.    Yesterday's Events:   - no issues overnight   - sedation off:  Grasping with right, wiggling toes, not moving left-->?posturing of LUE per nursing observation   - SR 70-80s   - -130s   - afebrile   - right NG in place, NPO   - BM yesterday   - UOP of 600cc overnight with white   - Propofol at 30   - NS at 100cc/hr   - mobilize with ROM to extremities-->edge of bed   - vent day 2   - CXR(reviewed):   ? Right pleural effusion vs atelectasis with clear lung fields   - attempt SBT   - lovenox/pepcid   - hydrocortisone 100mg TID   - K 3.7   - phos 2.2    Review of Systems  Review of Systems   Constitutional: Positive for malaise/fatigue. Negative for chills and fever.   HENT: Negative for congestion and sore throat.    Eyes: Negative for discharge and redness.   Respiratory: Negative for cough and shortness of breath.    Cardiovascular: Negative for palpitations and leg swelling.   Gastrointestinal: Negative for abdominal pain, nausea and vomiting.   Genitourinary: Negative for flank pain and hematuria.   Musculoskeletal: Positive for back pain and neck pain. Negative for falls.   Skin: Negative for rash.   Neurological: Positive for weakness. Negative for headaches.   Endo/Heme/Allergies: Does not bruise/bleed easily.   Psychiatric/Behavioral: Positive for depression. The patient is not nervous/anxious.         Vital Signs for last 24 hours   Pulse:  [65-83] 76  Resp:  [0-24] 4  BP: ()/(48-74) 128/69  SpO2:  [95 %-99 %] 96 %    Hemodynamic parameters for last 24 hours       Respiratory Information for the last 24 hours  Vent Mode: ASV  PEEP/CPAP: 8  MAP: 13  Control VTE (exp VT): 394    Physical Exam   Physical Exam  Vitals and nursing note reviewed.   Constitutional:       General: She is not in acute distress.     Appearance: She is obese. She is ill-appearing. She is not toxic-appearing.      Comments: Not sedated, following all commands while in ventilator   HENT:      Head: Normocephalic and atraumatic.      Right Ear: External ear normal.      Left Ear: External ear normal.      Nose: Nose normal. No rhinorrhea.      Mouth/Throat:      Mouth: Mucous membranes are moist.      Comments: ETT in place  Eyes:      General: No scleral icterus.     Extraocular Movements: Extraocular movements intact.      Conjunctiva/sclera: Conjunctivae normal.      Pupils: Pupils are equal, round, and reactive to light.    Cardiovascular:      Rate and Rhythm: Normal rate and regular rhythm.      Pulses: Normal pulses.      Heart sounds: Normal heart sounds. No murmur heard.     Pulmonary:      Effort: No respiratory distress.      Breath sounds: Normal breath sounds. No wheezing.      Comments: Breathing comfortably on the vent  Chest:      Chest wall: No tenderness.   Abdominal:      General: Bowel sounds are normal. There is no distension.      Palpations: Abdomen is soft.      Tenderness: There is no abdominal tenderness. There is no guarding or rebound.   Genitourinary:     Comments: Avila in place  Musculoskeletal:         General: No deformity. Normal range of motion.      Cervical back: Normal range of motion and neck supple.      Right lower leg: No edema.      Left lower leg: No edema.   Lymphadenopathy:      Cervical: No cervical adenopathy.   Skin:     General: Skin is warm and dry.      Capillary Refill: Capillary refill takes less than 2 seconds.      Findings: No rash.   Neurological:      Cranial Nerves: No cranial nerve deficit.      Sensory: No sensory deficit.      Motor: No weakness.      Comments: Following all commnads   Psychiatric:      Comments: Normal behavior/thought content post extubation         Medications  Current Facility-Administered Medications   Medication Dose Route Frequency Provider Last Rate Last Admin   • levothyroxine (SYNTHROID) tablet 150 mcg  150 mcg Enteral Tube AM ES Christy Smith M.D.   150 mcg at 05/22/21 0532   • Pharmacy Consult: Enteral tube insertion - review meds/change route/product selection  1 Each Other PHARMACY TO DOSE Christy Smith M.D.       • baclofen (LIORESAL) tablet 10 mg  10 mg Enteral Tube 4X/DAY Christy Smith M.D.   10 mg at 05/21/21 2113   • cloNIDine (CATAPRES) tablet 0.1 mg  0.1 mg Enteral Tube Q6HRS PRN Christy Smith M.D.       • acetaminophen (Tylenol) tablet 650 mg  650 mg Enteral Tube Q6HRS PRN Christy Smith M.D.   650 mg at 05/22/21 0532    • ondansetron (ZOFRAN ODT) dispertab 4 mg  4 mg Enteral Tube Q4HRS PRN Christy Smith M.D.       • promethazine (PHENERGAN) tablet 12.5-25 mg  12.5-25 mg Enteral Tube Q4HRS PRN Christy Smith M.D.       • albuterol (PROVENTIL) 2.5mg/0.5ml nebulizer solution 2.5 mg  2.5 mg Nebulization Q2HRS PRN (RT) Christy Smith M.D.   2.5 mg at 05/21/21 2138   • Respiratory Therapy Consult   Nebulization Continuous RT Jonathan Coppola M.D.       • famotidine (PEPCID) tablet 20 mg  20 mg Enteral Tube Q12HRS Jonathan Coppola M.D.   20 mg at 05/21/21 1715    Or   • famotidine (PEPCID) injection 20 mg  20 mg Intravenous Q12HRS Jonathan Coppola M.D.   20 mg at 05/22/21 0532   • senna-docusate (PERICOLACE or SENOKOT S) 8.6-50 MG per tablet 2 tablet  2 tablet Enteral Tube BID Jonathan Coppola M.D.   2 tablet at 05/22/21 0532    And   • polyethylene glycol/lytes (MIRALAX) PACKET 1 Packet  1 Packet Enteral Tube QDAY PRN Jonathan Coppola M.D.        And   • magnesium hydroxide (MILK OF MAGNESIA) suspension 30 mL  30 mL Enteral Tube QDAY PRN Jonathan Coppola M.D.        And   • bisacodyl (DULCOLAX) suppository 10 mg  10 mg Rectal QDAY PRN Jonathan Coppola M.D.       • MD Alert...ICU Electrolyte Replacement per Pharmacy   Other PHARMACY TO DOSE Jonathan Coppola M.D.       • lidocaine (XYLOCAINE) 1 % injection 1-2 mL  1-2 mL Tracheal Tube Q30 MIN PRN Jonathan Coppola M.D.       • propofol (DIPRIVAN) injection  0-80 mcg/kg/min Intravenous Continuous Jonathan Coppola M.D. 12.8 mL/hr at 05/22/21 0622 25 mcg/kg/min at 05/22/21 0622   • fentaNYL (SUBLIMAZE) injection 25 mcg  25 mcg Intravenous Q HOUR PRN Jonathan Coppola M.D.        Or   • fentaNYL (SUBLIMAZE) injection 50 mcg  50 mcg Intravenous Q HOUR PRN Jonathan Coppola M.D.        Or   • fentaNYL (SUBLIMAZE) injection 100 mcg  100 mcg Intravenous Q HOUR PRN Jonathan Coppola M.D.   100 mcg at 05/20/21 1501   • fentaNYL (SUBLIMAZE) 50 mcg/mL in 50mL   Intravenous Continuous Jonathan Coppola M.D.   Dose not  Required at 05/20/21 1445   • LORazepam (ATIVAN) injection 2-4 mg  2-4 mg Intravenous Q5 MIN PRN Jonathan Coppola M.D.       • enoxaparin (LOVENOX) inj 40 mg  40 mg Subcutaneous DAILY Sammy Ross M.D.   40 mg at 05/22/21 0532   • enalaprilat (VASOTEC) injection 1.25 mg  1.25 mg Intravenous Q6HRS PRN Sammy Ross M.D.       • labetalol (NORMODYNE/TRANDATE) injection 10 mg  10 mg Intravenous Q4HRS PRN Sammy Ross M.D.       • ondansetron (ZOFRAN) syringe/vial injection 4 mg  4 mg Intravenous Q4HRS PRN Sammy Ross M.D.       • promethazine (PHENERGAN) suppository 12.5-25 mg  12.5-25 mg Rectal Q4HRS PRN Sammy Ross M.D.       • prochlorperazine (COMPAZINE) injection 5-10 mg  5-10 mg Intravenous Q4HRS PRN Sammy Ross M.D.       • insulin regular (HumuLIN R,NovoLIN R) injection  2-9 Units Subcutaneous Q6HRS Sammy Ross M.D.        And   • dextrose 50% (D50W) injection 50 mL  50 mL Intravenous Q15 MIN PRN Sammy Ross M.D.       • NS infusion   Intravenous Continuous Mary Kowalski M.D. 100 mL/hr at 05/21/21 1719 New Bag at 05/21/21 1719       Fluids    Intake/Output Summary (Last 24 hours) at 5/22/2021 0703  Last data filed at 5/22/2021 0600  Gross per 24 hour   Intake 4334.13 ml   Output 1750 ml   Net 2584.13 ml       Laboratory  Recent Labs     05/21/21  0210 05/22/21  0301   ISTATAPH 7.490 7.542*   ISTATAPCO2 23.5* 24.3*   ISTATAPO2 66 101*   ISTATATCO2 19* 22   BZOSRQI0IQU 95 99   ISTATARTHCO3 17.9 20.9   ISTATARTBE -4 -1   ISTATTEMP 37.7 C 38.0 C   ISTATFIO2 30 30   ISTATSPEC Arterial Arterial   ISTATAPHTC 7.479 7.527*   HUEFGHIT0OT 69 107*     Recent Labs     05/20/21  1629   CPKTOTAL 162*     Recent Labs     05/20/21  1629 05/21/21  0425 05/22/21  0415   SODIUM 142 133* 138   POTASSIUM 3.5* 3.7 3.2*   CHLORIDE 111 108 110   CO2 22 20 21   BUN 11 9 11   CREATININE 0.95 0.90 0.83   MAGNESIUM  --  2.1 2.2   PHOSPHORUS  --  2.2* 2.1*   CALCIUM 8.4* 7.5* 7.6*     Recent Labs      05/20/21  1629 05/21/21  0425 05/22/21  0415   ALTSGPT 40  --   --    ASTSGOT 33  --   --    ALKPHOSPHAT 44  --   --    TBILIRUBIN 0.7  --   --    GLUCOSE 120* 131* 113*     Recent Labs     05/20/21  1629 05/21/21  0425 05/22/21  0415   WBC  --  8.3 7.2   NEUTSPOLYS  --  88.60* 80.50*   LYMPHOCYTES  --  4.80* 11.50*   MONOCYTES  --  5.80 7.10   EOSINOPHILS  --  0.00 0.40   BASOPHILS  --  0.10 0.10   ASTSGOT 33  --   --    ALTSGPT 40  --   --    ALKPHOSPHAT 44  --   --    TBILIRUBIN 0.7  --   --      Recent Labs     05/21/21  0425 05/22/21  0415   RBC 3.22* 2.91*   HEMOGLOBIN 10.4* 9.4*   HEMATOCRIT 30.3* 27.5*   PLATELETCT 131* 147*       Imaging  RUQ US:  Wall echo shadow complex compatible with the presence of multiple gallstones. There is gallbladder wall thickening but no pericholecystic fluid. Gallbladder wall thickening can be seen in cholecystitis, hepatitis, cirrhosis and hypoproteinemia. Shadowing   from a porcelain  gallbladder is also considered.     No biliary ductal dilatation is seen.     Pancreas and distal aorta are obscured by overlying bowel gas, limiting evaluation.     Right pleural effusion.    MRI brain:  1.  There are multifocal abnormal T2 hyperintensities in the subcortical and periventricular white matter and brainstem consistent with chronic demyelinating plaques of multiple sclerosis. None of the lesions demonstrates contrast enhancement. There has   been no significant interval change.  2.  Mild-to-moderate cerebral volume loss.  3.  There is T2 hyperintensity in the bilateral temporomandibular joint, more so on the left side likely representing degeneration.    Assessment/Plan  Postextubation stridor  Assessment & Plan  RT/O2 protocols  Cool mist O2 therapy  Racemic epi nebulizers  Decadron 10mg IVP    Goals of care, counseling/discussion  Assessment & Plan  I spoke to the patient's  at the bedside on 5/21.  He explained her functionality:  Walks with a walker, family needed for  most tasks, still dresses herself/grooms herself, cares for rescue pets, but there has been decline over the past 6 months.  He states that she does not have a POLST or Advanced Directive, but they have spoken about end of life decisions:  No long term vent support, no tracheostomy, and if patient cannot walk again (ie wheelchair bound) she would want comfort focused care/hospice.  At this point, Sabino, would like to see if she can recover enough over the next 1-2 days and see if she would be strong enough to extubate on her own; however, if it appears that this is not a possibility he would shift care to comfort-focused with compassionate extubation.    5/22 Patient extubated.  Zhou confirmed that she does not want the breathing tube to be replaced should she have issues.  She wants to be a full DNR/DNI.    Hypokalemia  Assessment & Plan  Replete to goal > 4    Hypophosphatemia- (present on admission)  Assessment & Plan  Replete to goal > 2.5    Acute encephalopathy- (present on admission)  Assessment & Plan  Suspect multifactorial:  ? Baclofen use as well as hypothyroidism vs MS exacerbation-->resolved and following commands  Cont synthroid  MRI/CT brain without pathology  Neuro following  Steroids  Limit sedation  LP negative    Acute respiratory failure with hypoxia (HCC)  Assessment & Plan  Intubated at [facility] for airway protection/hypoxia  Cont full vent support  RT/O2 protocols  Vent bundle protocols  I am actively adjusting vent settings based on ABGs/vent mechanics-->extubated  SATs/SBTs  **developed stridor:  Continue racemic epi nebulizers, s/p decadron 10mg IVP x 1, oxymask with cool mist    Multiple sclerosis (HCC)  Assessment & Plan  ? Exacerbation  Neurology following  Cont home baclofen to avoid withdrawal syndrome  Hydrocortisone 100mg TID x 1 day  Progressive per  with decline in function over the past 6 months.    Hypothyroidism  Assessment & Plan  ?myedema coma  S/p IV synthroid  Cont  synthroid 150mcg daily  Cont cytomel       VTE:  Lovenox  Ulcer: H2 Antagonist  Lines: Avila Catheter  Ongoing indication addressed and PIVs    I have performed a physical exam and reviewed and updated ROS and Plan today (5/22/2021). In review of yesterday's note (5/21/2021), there are no changes except as documented above.     Discussed patient condition and risk of morbidity and/or mortality with Family, RN, RT, Pharmacy, Charge nurse / hot rounds, Patient and neurology     Patient remains critically ill requiring multiple racemic epi nebulized treatments and active titration of oxygen requirements for post extubation laryngeal stridor.  The patient remains at high risk of clinical deterioration, worsening vital organ dysfunction, and death without the above critical care interventions.    Critical care time = 35 minutes in directly providing and coordinating critical care and extensive data review.  No time overlap and excludes procedures.    **Addendum:  Met with family later in the morning.  Zhou was less responsive and more hypoxic.  Family transitioned her care to comfort-focused treatment.  Spiritual Care provided.

## 2021-05-22 NOTE — PROGRESS NOTES
Pt extubated at 10 am, stridorous, MD aware.  Pt and family opted twoards comfort care.  Morphine 5 and 2 mg ativan given.  Pt passed at 11:27am with family and  at bedside, Spiritual care contacted, referral made to donor network, referral number [id number].

## 2021-05-22 NOTE — CARE PLAN
Problem: Ventilation  Goal: Ability to achieve and maintain unassisted ventilation or tolerate decreased levels of ventilator support  Description: Document on Vent flowsheet    1.  Support and monitor invasive and noninvasive mechanical ventilation  2.  Monitor ventilator weaning response  3.  Perform ventilator associated pneumonia prevention interventions  4.  Manage ventilation therapy by monitoring diagnostic test results  Outcome: Not Met   Vent day 3  , +8, 30%

## 2021-05-22 NOTE — CARE PLAN
Problem: Safety - Medical Restraint  Goal: Remains free of injury from restraints (Restraint for Interference with Medical Device)  Outcome: Progressing  Goal: Free from restraint(s) (Restraint for Interference with Medical Device)  Outcome: Progressing     Problem: Pain - Standard  Goal: Alleviation of pain or a reduction in pain to the patient’s comfort goal  Outcome: Progressing   The patient is Watcher - Medium risk of patient condition declining or worsening         Progress made toward(s) clinical / shift goals:  Pt remains safe and resting comfortably in bed    Patient is not progressing towards the following goals:

## 2021-05-22 NOTE — DISCHARGE SUMMARY
Death Summary    Cause of Death  Acute Hypoxic Respiratory Failure due to Acute Metabolic Encephalopathy due to Progressive End Stage Multiple Sclerosis     Comorbid Conditions at the Time of Death  Active Problems:    Acute encephalopathy POA: Yes    Hypophosphatemia POA: Yes    Hypokalemia POA: No    Goals of care, counseling/discussion POA: Unknown    Postextubation stridor POA: No    Hypothyroidism POA: Unknown    Multiple sclerosis (HCC) POA: Unknown    Elevated LFTs POA: Unknown    Acute respiratory failure with hypoxia (HCC) POA: Unknown  Resolved Problems:    * No resolved hospital problems. *      History of Presenting Illness and Hospital Course  Mrs. Chiang is a 60 year old lady with the past medical history of glaucoma, osteoarthritis, hypothyroidism, and progressive multiple sclerosis with decline over the past 6 months per family who was found by her  unresponsive with spilled pill bottles and half of her baclofen missing.  She was taken to [facility] and intubated for airway protection.  She was transferred to [facility] as this is her preferred provider with insurance on 5/20/2021.  CT head was negative for acute pathology.  MRI brain without acute pathology.  EEG showing metabolic encephalopathy without seizures.  Family confirmed that the patient hadn't been taking her synthroid for significantly long time.  The patient is a confirmed DNAR.  Over the next 2 days, the patient had improvement to her mental status and was able to follow commands.  She underwent a spontaneous breathing trial the morning of 5/22/2021.  She was successfully extubated.  A conversation with the patient and the  ensued and the patient endorsed wanting to be DNR/DNI.  Unfortunately, the patient developed postextubation stridor and worsening hypoxia with worsening unresponsiveness.  The family chose to transition care to comfort-focused treatment.  The patient passed away peacefully with the family  at her bedside.    Time of Death:  11:27am  2 RNs pronounced.

## 2021-05-22 NOTE — PROGRESS NOTES
Spiritual Care Note    Patient Information     Patient's Name: Lisa Chiang   MRN: [id number]    YOB: 1960   Age and Gender: 60 y.o. female   Service Area: [facility]   Room (and Bed): [place] [address]   Ethnicity or Nationality:     Primary Language: English   Episcopal/Spiritual preference: None   Place of Residence: [place]   Family/Friends/Others Present: Yes   Clinical Team Present: Yes   Medical Diagnosis(-es)/Procedure(s): Altered Mental Status    Code Status: Comfort Care/DNR    Date of Admission: 5/20/2021   Length of Stay: 2 days        Spiritual Care Provider Information:  Name of Spiritual Care Provider: Bennie Salguero  Title of Spiritual Care Provider: Associate   Phone Number: [phone]  E-mail: [email or web address]  Total time : 30 minutes    Spiritual Screen Results:    Gen Nursing        Palliative Care         Encounter/Request Information    Encounter/Request Type   Visited With: Family, Patient not available    Nature of the Visit: Initial, On shift    Crisis Visit: Death    Referral From/ Origin of Request: Epic nursing, Verbal staff    Religous Needs/Values       Spiritual Assessment     Spiritual Care Encounters    Intended Effects: Journeying With Someone in Grief Process    Interventions: Compassionate Presence, Companionship, Prayer    Notes: Pt had passed when  arrived.  spent time with Pt's family, consoling their grief and praying for them and Pt.

## 2021-05-23 LAB
BACTERIA CSF CULT: NORMAL
GRAM STN SPEC: NORMAL
SIGNIFICANT IND 70042: NORMAL
SITE SITE: NORMAL
SOURCE SOURCE: NORMAL

## 2021-05-31 NOTE — DOCUMENTATION QUERY
[facility]                                                                       Query Response Note      PATIENT:               LORRAINE WHITT  ACCT #:                  [id number]  MRN:                     [id number]  :                      1960  ADMIT DATE:       2021 1:49 PM  DISCH DATE:        2021 11:27 AM  RESPONDING  PROVIDER #:        [id number]           QUERY TEXT:    Myxedema coma is documented in the Critical Care Consult Note and not documented in DC Summary.  Can the diagnosis of Myxedema coma be clarified?     NOTE-  If an appropriate response is not listed below, please respond with a new note.      The patient's clinical indicators include:  Per Critical Care Consults: possible myxedema coma as differential for encephalopathy, TSH elevated, free t4 low, treat for myxedema coma/ hypothyroidism   Per DC Summary: hypothyroidism, family confirmed that the patient hadn't been taking her synthroid for significantly long time    TSH 36.3, Free T4 0.20, T3, free 0.96  Risk Factors: not taking synthroid   Treatment: levothyroxine IV & PO , decadron, solu-cortef    Thank You,  Stepan Lawrence RN, BSN  Clinical    Connect via [employer]  Options provided:   -- Myxedema coma ruled in   -- Myxedema coma ruled out   -- Findings of no clinical significance   -- Unable to determine      Query created by: Stepan Lawrence on 2021 1:26 PM    RESPONSE TEXT:    Unable to determine          Electronically signed by:  ELOY CHAPPELL MD 2021 6:33 AM

## 2022-02-23 NOTE — CONSULTS
Critical care consult note    Seen and discussed with resident.  Agree with findings except as noted in my note.  Please see my note for further details.      Patient is a 60 year old female with progressive multiple sclerosis with decline over 6 months per family.  She presented at [facility] with altered mental status and hypothermia.  CT head imaging negative. Intubated for airway protection.  Chest xray clear.  She takes baclofen outpatient.  Labs essentially normal other than tsh 15, ast/alt mild elevation and cpk 208.  EEG at [facility] [place] but no one available to read eeg, cd sent here.  I discussed with neurology and unable to read outside eeg so will repeat EEG here.  She opens eyes to voice/physical stimulation but unclear if reflexive, some rhythmic motion of extremities.  [facility] on arrival.  Here less than 8.  Temperature there down to 95F with bradycardia in the 40s.  Able to take care of daily activities but not working or driving and uses walker.  Blood cx still pending at [facility].  She is on synthroid 50 mcg at home but per family not taking.  Give 1 g solumedrol per day and received 1 dose for possible MS exacerbation, neurology said ok to hold along with MS medication.  Baclofen restarted this am and will continue home dose.  She was transferred to this facility per insurance recommendation/request.  PMH MS, glaucoma, arthritis and hypothyroidism.  Discussed with family and DNAR and intubation ok.  Propofol and fentanyl drips for sedation.  Neurology recommended MRI brain, none done at [facility] per records reviewed.  High lung volumes and over breathing ventilator, changed to asv.      I talked to  at bedside. DNAR confirmed.  He thinks due to baclofen as found with bottle, unclear how many she may have taken. She has been somewhat depressed but no suicidal intention recently per .  He thinks likely due to overuse of medication due to ongoing pain.  He saw her prior to  Treatment Type (Optional): Deep Cleanse Treatment Exfoliation Type: scrub work.  No recent infectious etiology.      Addendum  Encephalopathy on eeg, no seizures.  LP planned.  INR normal 5/18, ct head atrophy present with MS changes.    Addendum  LP to be done per hospitalist  Discussed half dose baclofen for now  TSH elevated, free t4 low, treat for myxedema coma/hypothyroidism  Cortisol normal    Reviewed chart notes, orders, labs and imaging    Assessment and Plan  Respiratory failure with hypoxia, on ventilator.  Propofol and fentanyl drips for sedation. Chest xray no significant signs of infection.    Suspected seizure, eeg.  EEG from [facility] no read available until tomorrow. Repeat here.  MS, possible flare. Hold on steroids per neurology, received 1 gram solumedrol at [facility]  Hypothyroidism, tsh 15 at outside facility. Free t 4 ordered.  Possible etiology for encephalopathy and hypothermia.  Acute encephalopathy, negative infectious work up so far. Possible hypothyroidism, baclofen. toxicity/withdrawal, seizure, metabolic.  EEG pending. Ct head negative outside facility. MRI brain ordered. If no seizures on eeg then needs LP  DNAR, intubation ok    Patient is critically ill. Patient is on a mechanical ventilator.  Propofol drip and fentanyl drip for sedation.  EEG stat for possible seizure.  If untreated there is a high chance of deterioration and eventually death. The critical that has been undertaken is medically complex. There has been no overlap in critical care time. Critical Care Time not including procedures: 90 minutes.       Detail Level: Zone Comments (Sticky): Simple cleanse, exfoliate, extraction, moisturizer